# Patient Record
Sex: FEMALE | Race: WHITE | NOT HISPANIC OR LATINO | Employment: OTHER | URBAN - METROPOLITAN AREA
[De-identification: names, ages, dates, MRNs, and addresses within clinical notes are randomized per-mention and may not be internally consistent; named-entity substitution may affect disease eponyms.]

---

## 2020-05-08 ENCOUNTER — OFFICE VISIT (OUTPATIENT)
Dept: PULMONOLOGY | Facility: CLINIC | Age: 65
End: 2020-05-08

## 2020-05-08 VITALS
SYSTOLIC BLOOD PRESSURE: 110 MMHG | OXYGEN SATURATION: 97 % | HEIGHT: 69 IN | DIASTOLIC BLOOD PRESSURE: 60 MMHG | HEART RATE: 74 BPM | BODY MASS INDEX: 24.38 KG/M2 | WEIGHT: 164.6 LBS | TEMPERATURE: 96.9 F

## 2020-05-08 DIAGNOSIS — Q33.3 CONGENITAL ABSENCE OF LUNG: Primary | ICD-10-CM

## 2020-05-08 DIAGNOSIS — Z90.2 S/P PNEUMONECTOMY: ICD-10-CM

## 2020-05-08 DIAGNOSIS — G47.33 OSA (OBSTRUCTIVE SLEEP APNEA): ICD-10-CM

## 2020-05-08 DIAGNOSIS — J98.4 RESTRICTIVE LUNG DISEASE: ICD-10-CM

## 2020-05-08 DIAGNOSIS — J47.9 BRONCHIECTASIS WITHOUT ACUTE EXACERBATION (HCC): ICD-10-CM

## 2020-05-08 DIAGNOSIS — Z00.6 EXAMINATION FOR NORMAL COMPARISON FOR CLINICAL RESEARCH: Primary | ICD-10-CM

## 2020-05-08 DIAGNOSIS — J30.1 SEASONAL ALLERGIC RHINITIS DUE TO POLLEN: ICD-10-CM

## 2020-05-08 PROCEDURE — 99204 OFFICE O/P NEW MOD 45 MIN: CPT | Performed by: INTERNAL MEDICINE

## 2020-05-08 PROCEDURE — 94060 EVALUATION OF WHEEZING: CPT | Performed by: INTERNAL MEDICINE

## 2020-05-08 PROCEDURE — 94618 PULMONARY STRESS TESTING: CPT | Performed by: INTERNAL MEDICINE

## 2020-05-08 PROCEDURE — 94726 PLETHYSMOGRAPHY LUNG VOLUMES: CPT | Performed by: INTERNAL MEDICINE

## 2020-05-08 PROCEDURE — 94729 DIFFUSING CAPACITY: CPT | Performed by: INTERNAL MEDICINE

## 2020-05-08 RX ORDER — MONTELUKAST SODIUM 10 MG/1
10 TABLET ORAL NIGHTLY
COMMUNITY
Start: 2020-04-21

## 2020-05-08 RX ORDER — ALBUTEROL SULFATE 90 UG/1
4 AEROSOL, METERED RESPIRATORY (INHALATION) ONCE
Status: COMPLETED | OUTPATIENT
Start: 2020-05-08 | End: 2020-05-08

## 2020-05-08 RX ORDER — ALBUTEROL SULFATE 2.5 MG/3ML
2.5 SOLUTION RESPIRATORY (INHALATION) EVERY 4 HOURS PRN
COMMUNITY
Start: 2020-04-23 | End: 2020-11-13

## 2020-05-08 RX ORDER — BUDESONIDE 0.5 MG/2ML
0.5 INHALANT ORAL
COMMUNITY
Start: 2020-04-23

## 2020-05-08 RX ORDER — ESTRADIOL 0.1 MG/G
2 CREAM VAGINAL DAILY
COMMUNITY
End: 2022-12-06 | Stop reason: SDUPTHER

## 2020-05-08 RX ORDER — FLUTICASONE PROPIONATE 50 MCG
2 SPRAY, SUSPENSION (ML) NASAL AS NEEDED
COMMUNITY
End: 2021-05-11

## 2020-05-08 RX ORDER — LEVOTHYROXINE SODIUM 100 UG/1
75 CAPSULE ORAL DAILY
COMMUNITY
Start: 2020-04-21 | End: 2021-11-02 | Stop reason: ALTCHOICE

## 2020-05-08 RX ADMIN — ALBUTEROL SULFATE 4 PUFF: 90 AEROSOL, METERED RESPIRATORY (INHALATION) at 09:43

## 2020-05-08 NOTE — PROGRESS NOTES
"Crystal Carrillo is a 65 y.o. female here for evaluation of   Chief Complaint   Patient presents with   • Congenital Absence of Lung   • Shortness of Breath       Problem list:  1. Restrictive ventilatory defect  2. H/o left pneumonectomy, infant  3. Bronchiectasis  4. History of Pseudomonas infection  5. H/o primary TB 2017 treated  6. SRINIVAS  7. Seasonal allergic rhinitis  8. Cervical disk disease  9. Hypothyroid  10. SVT  11. Permanent pacemaker, , battery change,   12. Left pneumonectomy, ,   13. Left thoracoplasty,   14. Childbirth x2  15. Appendectomy  16. Vaginal hysterectomy  17. Umbilical hernia repair  18. No drug allergies        History of Present Illness    65-year-old woman, non-smoker, here to establish care because her pulmonologist  unexpectedly in December.  When she was 6 weeks old she underwent a left pneumonectomy for \"cysts in her lungs.  She has been checked for cystic fibrosis and that testing has been negative.  Then in  she contracted active tuberculosis and received standard treatment.  During that year she also had her first pseudomonas aeruginosa infection.  She is currently using Pulmicort nebulized twice daily and albuterol twice daily and as needed.  She has been using these medications since  when she developed some wheezing.  She is symptomatically short of air walking up steps, doing housework, carrying groceries.  She has had no further hemoptysis since her tuberculosis was treated.  She developed a respiratory infection in January and end of February and was quite ill with a relentless cough.  She required 2 rounds of antibiotics.  While her cough is improved she has been symptomatically more short of air.  She does not describe wheezing.  Her last hospitalization was in  for her heart, SVT.  She has never required life support.  She was diagnosed with sleep apnea and started on CPAP.  Last summer she stopped wearing it.  She then got a new machine " and started wearing it again.  However more recently she is stopped again because she was afraid it might be causing the respiratory infections.  She does have fatigue.  With the spring season she has developed some clear watery sinus congestion.  She has no history of aspirin sensitivity or nasal polyps.  She does have a history of SVT and had a pacemaker placed in 2008 and a new battery in 2014.  To her knowledge she has not had an ablation.  Her last pacemaker procedure was done in [facility].  She knows that she is has a recent Tdap vaccine when her grandson was born.  She recalls getting the Pneumovax 23 but is not sure whether she has had a Prevnar vaccine.    Review of Systems   Constitutional: Negative for appetite change and fever.   HENT: Positive for postnasal drip. Negative for ear pain, rhinorrhea and sore throat.    Respiratory: Positive for shortness of breath and wheezing.    Cardiovascular: Negative for chest pain and leg swelling.   Gastrointestinal: Negative for abdominal pain and vomiting.   Musculoskeletal: Positive for neck pain.   Skin: Negative for rash.   Allergic/Immunologic: Positive for environmental allergies.   Neurological: Positive for headaches.         Current Outpatient Medications:   •  albuterol (PROVENTIL) (2.5 MG/3ML) 0.083% nebulizer solution, Take 2.5 mg by nebulization Every 4 (Four) Hours As Needed., Disp: , Rfl:   •  budesonide (PULMICORT) 0.5 MG/2ML nebulizer solution, Take 0.5 mg by nebulization Daily., Disp: , Rfl:   •  estradiol (ESTRACE) 0.1 MG/GM vaginal cream, Insert 2 g into the vagina Daily., Disp: , Rfl:   •  fluticasone (FLONASE) 50 MCG/ACT nasal spray, 2 sprays into the nostril(s) as directed by provider Daily., Disp: , Rfl:   •  levothyroxine (SYNTHROID, LEVOTHROID) 112 MCG tablet, Take 112 mcg by mouth Daily., Disp: , Rfl:   •  montelukast (SINGULAIR) 10 MG tablet, Take 10 mg by mouth Every Night., Disp: , Rfl:   •  sertraline (ZOLOFT) 50 MG  "tablet, Take 50 mg by mouth Daily., Disp: , Rfl:   No current facility-administered medications for this visit.     Past Medical History:   Diagnosis Date   • Allergic rhinitis    • Anxiety    • Cervical disc disease    • COPD (chronic obstructive pulmonary disease) (CMS/HCC)    • Hypothyroid    • PSVT (paroxysmal supraventricular tachycardia) (CMS/[facility])    • PVC's (premature ventricular contractions)    • Sleep apnea    • TB (pulmonary tuberculosis) 2017     Past Surgical History:   Procedure Laterality Date   • APPENDECTOMY     • CHEST SURGERY  1971    thoracoplasty   • LAPAROSCOPIC ASSISTED VAGINAL HYSTERECTOMY     • LUNG REMOVAL, TOTAL Left     infant for cyst   • PACEMAKER IMPLANTATION  2015    [facility]; 2008 first one   • UMBILICAL HERNIA REPAIR       Social History     Socioeconomic History   • Marital status:      Spouse name: Not on file   • Number of children: 2   • Years of education: Not on file   • Highest education level: Not on file   Occupational History   • Occupation: homemaker/   Tobacco Use   • Smoking status: Never Smoker   • Smokeless tobacco: Never Used   Substance and Sexual Activity   • Alcohol use: Never     Frequency: Never   • Drug use: Never   • Sexual activity: Defer   Social History Narrative     to second      Family History   Problem Relation Age of Onset   • Intracerebral hemorrhage Mother    • Glaucoma Father      Blood pressure 110/60, pulse 74, temperature 96.9 °F (36.1 °C), height 175.3 cm (69\"), weight 74.7 kg (164 lb 9.6 oz), SpO2 97 %    Physical Exam   Constitutional: She is oriented to person, place, and time. She appears well-developed and well-nourished. No distress.   HENT:   Head: Atraumatic.   Nasal erythema, clear PND   Eyes: Pupils are equal, round, and reactive to light.   Neck: No JVD present. No thyromegaly present.   Cardiovascular: Normal rate, regular rhythm and normal heart sounds.   No murmur " heard.  Pulmonary/Chest:   Diminished chest expansion on left; right chest clear, PPM left   Abdominal: Soft. Bowel sounds are normal. She exhibits no distension. There is no tenderness.   Musculoskeletal: She exhibits no edema.   Lymphadenopathy:     She has no cervical adenopathy.   Neurological: She is oriented to person, place, and time.   Skin: Skin is warm.   Psychiatric: She has a normal mood and affect.         PFTs:  FVC 1.32 L, 35%, FEV1 0.87 L, 30% ratio 66%, after bronchodilators FEV1 improves to 0.96, 33%, total lung capacity 2.46 L, 42%, diffusion capacity 8.4, 38    Severe restriction and diffusion impairment, mild obstruction    Radiology:      Right upper lobe has herniated over into the left upper chest.  Dual-chamber pacemaker in place.  Hyperinflation of the right lung with shift of the mediastinum to the left.  Scoliosis of her thoracic spine.  No acute infiltrate    6-minute walk test, resting saturation 97%, after walking 5 minutes saturation dropped to 89%.  She was able to walk 1200 feet.  Lab:    May 5, 2020, free T4 1.18, sodium 139, potassium 4.6, chloride 101, bicarbonate 30, BUN 24, creatinine 0.7, glucose 93, liver function tests normal, white count 4.6, hemoglobin 14.8, hematocrit 44.6, platelet count 264    Crystal was seen today for congenital absence of lung and shortness of breath.    Diagnoses and all orders for this visit:    Congenital absence of lung  -     albuterol sulfate HFA (PROVENTIL HFA;VENTOLIN HFA;PROAIR HFA) inhaler 4 puff  -     Pulmonary Function Test    Restrictive lung disease; chest wall deformity    Bronchiectasis without acute exacerbation (CMS/HCC)    SRINIVAS (obstructive sleep apnea)    Seasonal allergic rhinitis due to pollen    S/P pneumonectomy, left infant        Discussion:     Complex 65-year-old woman with a pneumonectomy as an infant, mild bronchiectasis, sleep apnea, allergic rhinitis, SVT with permanent pacemaker and a history of active tuberculosis.   Pulmonary function tests are striking with a total lung capacity of only 42%.  She does not currently have purulent sputum or active wheezing.  She does have a history of pseudomonas aeruginosa colonization.  Resting room air saturation is 97%.  She has been intermittently compliant with her CPAP.  This is inpart from respiratory infections and inadequate support from her [email or web address] company.  I suspect that she would benefit from possibly changing to BiPAP, not only for her sleep apnea but for her restrictive ventilatory defect.  I think it will be important to splint her chest open and relieve respiratory muscle fatigue in the future. Perhaps her persistent fatigue would improve and compliance as well if she tried BiPAP.   She also has symptoms of allergic rhinitis with clear drainage on examination.  She is using a nasal steroid, Claritin and Singulair.    Transition to BiPAP, as she has failed CPAP  So-Clean machine  6-minute walk test  Continue Pulmicort, albuterol nebulized  She has a flutter valve and I have encouraged her to use it if she has secretions  Incentive spirometry would be helpful to improve diaphragm function if he she used it 2-3 times daily  If she continues to develop recurrent Pseudomonas infections I will consider nebulized tobramycin every other month  Continue current allergy regimen  Check with her primary care physician about her vaccine record.  It is important that she gets a Pneumovax 23 every 5 years and a Prevnar 13 once  Follow-up in 6 months with spirometry      Susan Long MD        Answers for HPI/ROS submitted by the patient on 5/6/2020   Shortness of breath  What is the primary reason for your visit?: Shortness of Breath  Chronicity: chronic  Onset: more than 1 year ago  Frequency: constantly  Progression since onset: gradually worsening  Episode duration: 10 minutes  claudication: No  coryza: No  hemoptysis: No  leg pain: No  orthopnea: No  PND: No  sputum production:  No  swollen glands: No  syncope: No  Aggravating factors: smoke, exercise, any activity, fumes

## 2020-06-15 ENCOUNTER — OFFICE VISIT (OUTPATIENT)
Dept: NEUROLOGY | Facility: CLINIC | Age: 65
End: 2020-06-15

## 2020-06-15 VITALS
HEIGHT: 69 IN | BODY MASS INDEX: 24.29 KG/M2 | SYSTOLIC BLOOD PRESSURE: 112 MMHG | DIASTOLIC BLOOD PRESSURE: 68 MMHG | HEART RATE: 85 BPM | OXYGEN SATURATION: 95 % | WEIGHT: 164 LBS

## 2020-06-15 DIAGNOSIS — M54.81 CERVICO-OCCIPITAL NEURALGIA: ICD-10-CM

## 2020-06-15 DIAGNOSIS — G25.2 DYSTONIC TREMOR: Primary | ICD-10-CM

## 2020-06-15 PROCEDURE — 99204 OFFICE O/P NEW MOD 45 MIN: CPT | Performed by: PSYCHIATRY & NEUROLOGY

## 2020-06-15 RX ORDER — CYCLOBENZAPRINE HCL 10 MG
10 TABLET ORAL 3 TIMES DAILY PRN
COMMUNITY
End: 2020-09-18

## 2020-06-15 RX ORDER — PSEUDOEPHEDRINE HCL 30 MG
30 TABLET ORAL EVERY 4 HOURS PRN
COMMUNITY

## 2020-06-15 RX ORDER — NAPROXEN SODIUM 220 MG
220 TABLET ORAL 2 TIMES DAILY PRN
COMMUNITY
End: 2022-09-27 | Stop reason: CLARIF

## 2020-06-15 NOTE — PROGRESS NOTES
Answers for HPI/ROS submitted by the patient on 6/8/2020   Neurologic complaint  What is the primary reason for your visit?: Neurological Problem  focal weakness: Yes  memory loss: Yes  weakness: Yes  Chronicity: chronic  Onset: more than 1 year ago  Onset quality: insidiously  Progression since onset: waxing and waning  Focality: right-sided, upper extremity  abdominal pain: No  auditory change: No  aura: No  back pain: Yes  bladder incontinence: No  bowel incontinence: No  chest pain: Yes  confusion: No  diaphoresis: Yes  dizziness: No  fatigue: Yes  fever: No  headaches: Yes  light-headedness: No  nausea: No  neck pain: Yes  palpitations: No  shortness of breath: Yes  vertigo: No  vomiting: No  Treatments tried: acetaminophen, aspirin, medication, neck support, position change, walking  Improvement on treatment: moderate  Subjective:    CC: Crystal Carrillo is seen today in consultation at the request of Self Referring for Migraine (NP)       HPI:  65-year-old female with a history of hypothyroidism, obstructive sleep apnea currently not on CPAP, bronchiectasis, left lung resection as a child, symptomatic bradycardia status post pacemaker, scoliosis presents with head tremors and headaches.  As per patient she started having a tremor in her head about 15 years ago.  This worsens when she is tired and is accompanied by pain on the right side of her neck that has become worse in the past year.  Has not noticed any tremors in her hands.  She denies having any family history of tremors.   She had a CT C-spine that showed multilevel degenerative changes with moderate left to right foraminal stenosis at multiple levels..  Underwent physical therapy for 6 weeks that actually made the pain worse.  After that she was prescribed a muscle relaxant Skelaxin which did not help either.  With regards to her headaches patient states that she started having them as a teenager but they have also changed in frequency and  characteristic in the past 1 year.  She would mainly have them on the right side of her head occasionally radiating from the back to the front however  has had them all over now about once in 10 days without any nausea, vomiting or photophobia.  Has occasional phonophobia as well as a grinding sensation in her ear.  She takes Flexeril along with naproxen (1 to 2 tablets) which usually helps with the headaches.  Also takes Aleve every few days for her neck pain.  Patient has sleep apnea and was given a CPAP which she was compliant with until she had a Pseudomonas infection and tuberculosis.  Has started seeing a pulmonologist again and will be going for BiPAP soon.  Of note-I personally reviewed her CT of the cervical spine and her most recent labs.      The following portions of the patient's history were reviewed today and updated as of 06/15/2020  : allergies, current medications, past family history, past medical history, past social history, past surgical history and problem list  These document will be scanned to patient's chart.      Current Outpatient Medications:   •  Acetaminophen (TYLENOL EXTRA STRENGTH PO), Take  by mouth As Needed (When really bad HA and she is not near food- takes a combination of tylenol and sudafed)., Disp: , Rfl:   •  albuterol (PROVENTIL) (2.5 MG/3ML) 0.083% nebulizer solution, Take 2.5 mg by nebulization Every 4 (Four) Hours As Needed., Disp: , Rfl:   •  budesonide (PULMICORT) 0.5 MG/2ML nebulizer solution, Take 0.5 mg by nebulization Daily., Disp: , Rfl:   •  cyclobenzaprine (FLEXERIL) 10 MG tablet, Take 10 mg by mouth 3 (Three) Times a Day As Needed for Muscle Spasms (Neck pain)., Disp: , Rfl:   •  estradiol (ESTRACE) 0.1 MG/GM vaginal cream, Insert 2 g into the vagina Daily., Disp: , Rfl:   •  fluticasone (FLONASE) 50 MCG/ACT nasal spray, 2 sprays into the nostril(s) as directed by provider As Needed for Allergies (Seasonal/location wise-allergic to cat dander)., Disp: , Rfl:    •  levothyroxine (SYNTHROID, LEVOTHROID) 112 MCG tablet, Take 112 mcg by mouth Daily., Disp: , Rfl:   •  montelukast (SINGULAIR) 10 MG tablet, Take 10 mg by mouth Every Night. Prn for allergies, Disp: , Rfl:   •  naproxen sodium (ALEVE) 220 MG tablet, Take 220 mg by mouth 2 (Two) Times a Day As Needed for Mild Pain ., Disp: , Rfl:   •  pseudoephedrine (SUDAFED) 30 MG tablet, Take 30 mg by mouth Every 4 (Four) Hours As Needed (When really bad HA and she is not near food- takes a combination of tylenol and sudafed)., Disp: , Rfl:   •  sertraline (ZOLOFT) 50 MG tablet, Take 50 mg by mouth Daily. 6/15-reports weaning off of this. Currently on 50mg daily, next month down to 25mg and then she will D/C this, Disp: , Rfl:    Past Medical History:   Diagnosis Date   • Allergic rhinitis    • Anxiety    • Cervical disc disease    • COPD (chronic obstructive pulmonary disease) (CMS/HCC)    • Hypothyroid    • PSVT (paroxysmal supraventricular tachycardia) (CMS/[facility])    • PVC's (premature ventricular contractions)    • Sleep apnea    • TB (pulmonary tuberculosis) 2017      Past Surgical History:   Procedure Laterality Date   • APPENDECTOMY     • CHEST SURGERY  1971    thoracoplasty   • LAPAROSCOPIC ASSISTED VAGINAL HYSTERECTOMY     • LUNG REMOVAL, TOTAL Left     infant for cyst   • PACEMAKER IMPLANTATION  2015    [facility]; 2008 first one   • UMBILICAL HERNIA REPAIR        Family History   Problem Relation Age of Onset   • Intracerebral hemorrhage Mother    • Glaucoma Father       Social History     Socioeconomic History   • Marital status:      Spouse name: Not on file   • Number of children: 2   • Years of education: Not on file   • Highest education level: Not on file   Occupational History   • Occupation: homemaker/   Tobacco Use   • Smoking status: Never Smoker   • Smokeless tobacco: Never Used   Substance and Sexual Activity   • Alcohol use: Never     Frequency: Never   • Drug use: Never  "  • Sexual activity: Defer   Social History Narrative     to second      Review of Systems   Constitutional: Positive for diaphoresis and fatigue. Negative for fever.   Respiratory: Positive for shortness of breath.    Cardiovascular: Positive for chest pain. Negative for palpitations.   Gastrointestinal: Negative for abdominal pain, nausea and vomiting.   Genitourinary: Negative for urinary incontinence.   Musculoskeletal: Positive for back pain and neck pain.   Neurological: Positive for weakness. Negative for dizziness, light-headedness and confusion.   All other systems reviewed and are negative.      Objective:    /68   Pulse 85   Ht 175.3 cm (69\")   Wt 74.4 kg (164 lb)   LMP  (LMP Unknown)   SpO2 95%   BMI 24.22 kg/m²     Neurology Exam:    General apperance: NAD.     Mental status: Alert, awake and oriented to time place and person.    Recent and Remote memory: Intact.    Attention span and Concentration: Normal.     Language and Speech: Intact- No dysarthria.    Fluency, Naming , Repitition and Comprehension:  Intact    Cranial Nerves:   CN II: Visual fields are full. Intact. Fundi - Normal, No papillederma, Pupils - RENÉE  CN III, IV and VI: Extraocular movements are intact. Normal saccades.   CN V: Facial sensation is intact.   CN VII: Muscles of facial expression reveal no asymmetry. Intact.   CN VIII: Hearing is intact. Whispered voice intact.   CN IX and X: Palate elevates symmetrically. Intact  CN XI: Shoulder shrug is intact.   CN XII: Tongue is midline without evidence of atrophy or fasciculation.       Motor:-Constant, mild head tremor noted with reduced range of motion of the head to the right.  Tenderness to palpation of the right occipital region    Right UE muscle strength 5/5. Normal tone.     Left UE muscle strength 5/5. Normal tone.      Right LE muscle strength5/5. Normal tone.     Left LE muscle strength 5/5. Normal tone.      Sensory: Normal light touch, vibration " and pinprick sensation bilaterally.    DTRs: 2+ bilaterally in upper and lower extremities.    Babinski: Negative bilaterally.    Co-ordination: Normal finger-to-nose, heel to shin B/L.    Rhomberg: Negative.    Gait: Normal.    Cardiovascular: Regular rate and rhythm without murmur, gallop or rub.    Assessment and Plan:  1. Dystonic tremor  She could have an essential tremor versus dystonic tremor   I will refer her to Dr. Isaacs at  for Botox  - Ambulatory Referral to Neurology    2. Cervico-occipital neuralgia  Patient most likely has cervical occipital headaches which could be further worsened by her dystonic tremor.  Also her untreated sleep apnea could be making the headaches worse  If she continues to have headaches after getting Botox then I may schedule her for an occipital nerve block.  Till then I have asked her to continue taking a combination of Flexeril and naproxen however she should avoid using too much naproxen for fear of rebound headaches  Will also be getting a BiPAP soon for his sleep apnea    3.  Hypothyroidism  She has a history of hypothyroidism however her recent T4 was high and TSH was low.  I told her to speak to her endocrinologist about adjusting her Synthroid dose       Return in about 3 months (around 9/15/2020).         Jannette Simons MD

## 2020-06-19 ENCOUNTER — TELEPHONE (OUTPATIENT)
Dept: NEUROLOGY | Facility: CLINIC | Age: 65
End: 2020-06-19

## 2020-06-19 NOTE — TELEPHONE ENCOUNTER
Called and spoke to pt informing of scheduled appt with Dr. Ralph Muñoz, 10/29/20 @ 8:30 at [facility]. Pt stated verbal understanding. Faxed referral order, notes, demographics, ambulatory consent, and insurance ([phone]). Thanks.

## 2020-07-21 ENCOUNTER — TELEPHONE (OUTPATIENT)
Dept: NEUROLOGY | Facility: CLINIC | Age: 65
End: 2020-07-21

## 2020-07-21 NOTE — TELEPHONE ENCOUNTER
PT CALLED RE: NECK PAIN AND HEADACHES SHE HAS BEEN EXPERIENCING.  DR ADAM REFERRED HER TO DR. SAEZ AT  FOR BOTOX; HOWEVER, SHE DOES NOT HAVE A VISIT SCHEDULED UNTIL 12-16-20.  PT WOULD LIKE TO KNOW IF THERE ARE WAYS TO MITIGATE THE NECK PAIN AND HEADACHES PRIOR TO SEEING DR. SAEZ AS SHE IS HAVING DIFFICULTY GETTING THROUGH HER DAYS DUE TO PAIN.    PT LAST SEEN 6-15-20    PHARMACY:  [place], [place]  [phone]    BEST CALL BACK: [phone]; LEAVE VM IF NO ANSWER

## 2020-07-21 NOTE — TELEPHONE ENCOUNTER
Please let the patient know that I have contacted Dr. Isaacs to get the patient in sooner.  If she does not hear back from him within the next few days then she can call us again.

## 2020-08-03 ENCOUNTER — OFFICE VISIT (OUTPATIENT)
Dept: PULMONOLOGY | Facility: CLINIC | Age: 65
End: 2020-08-03

## 2020-08-03 VITALS
WEIGHT: 166.13 LBS | DIASTOLIC BLOOD PRESSURE: 84 MMHG | RESPIRATION RATE: 18 BRPM | HEIGHT: 69 IN | OXYGEN SATURATION: 95 % | TEMPERATURE: 96.9 F | HEART RATE: 97 BPM | BODY MASS INDEX: 24.61 KG/M2 | SYSTOLIC BLOOD PRESSURE: 116 MMHG

## 2020-08-03 DIAGNOSIS — J98.4 RESTRICTIVE LUNG DISEASE: ICD-10-CM

## 2020-08-03 DIAGNOSIS — G47.33 OSA (OBSTRUCTIVE SLEEP APNEA): ICD-10-CM

## 2020-08-03 DIAGNOSIS — J47.9 BRONCHIECTASIS WITHOUT ACUTE EXACERBATION (HCC): Primary | ICD-10-CM

## 2020-08-03 DIAGNOSIS — J30.1 SEASONAL ALLERGIC RHINITIS DUE TO POLLEN: ICD-10-CM

## 2020-08-03 PROCEDURE — 99213 OFFICE O/P EST LOW 20 MIN: CPT | Performed by: INTERNAL MEDICINE

## 2020-08-03 NOTE — PROGRESS NOTES
"Crystal Carrillo is a 65 y.o. female here for evaluation of   Chief Complaint   Patient presents with   • Congenital Absence of Lung     f/u       Problem list:  1. Restrictive ventilatory defect  2. H/o left pneumonectomy, infant  3. Bronchiectasis  4. History of Pseudomonas infection  5. H/o primary TB 2017 treated  6. SRINIVAS  7. Seasonal allergic rhinitis  8. Cervical disk disease  9. Hypothyroid  10. SVT  11. Permanent pacemaker, 2008, battery change, 2015  12. Left pneumonectomy, 1956, June  13. Left thoracoplasty, 1971  14. Childbirth x2  15. Appendectomy  16. Vaginal hysterectomy  17. Umbilical hernia repair  18. No drug allergies    History of Present Illness    65-year-old woman, non-smoker with a history of left pneumonectomy when she was 7 years of age for \"cysts\".  She tested negative for cystic fibrosis.  Subsequently in 2017 she developed active tuberculosis and received standard treatment.  Post treatment for tuberculosis she developed a pseudomonas aeruginosa infection.  She is currently using Pulmicort nebulized twice daily and albuterol as needed.  She is short of air walking up 1 flight of stairs.  She has a lot of neck discomfort.  She does have a history of sleep apnea and had previously used CPAP.  She has been off of it for over a year.  She does admit to fatigue.  She denies hemoptysis or purulent sputum.  While she is short of air she denies wheezing.  She does have some clear postnasal drip.  She currently does not take any medication for drainage.  At her initial visit I did arrange BiPAP 12/8.  She is tried 3 different masks but it blows out of her mouth and she feels like it is too high of a pressure.  She has not been able to tolerate it for an entire night.  She has not required antibiotics or an emergency room visit since I last saw her.    Review of Systems   Constitutional: Positive for activity change and fatigue.   HENT: Positive for postnasal drip.    Respiratory: Positive for cough " and shortness of breath. Negative for chest tightness and wheezing.    Musculoskeletal: Positive for neck pain and neck stiffness.         Current Outpatient Medications:   •  Acetaminophen (TYLENOL EXTRA STRENGTH PO), Take  by mouth As Needed (When really bad HA and she is not near food- takes a combination of tylenol and sudafed)., Disp: , Rfl:   •  albuterol (PROVENTIL) (2.5 MG/3ML) 0.083% nebulizer solution, Take 2.5 mg by nebulization Every 4 (Four) Hours As Needed., Disp: , Rfl:   •  budesonide (PULMICORT) 0.5 MG/2ML nebulizer solution, Take 0.5 mg by nebulization Daily., Disp: , Rfl:   •  cyclobenzaprine (FLEXERIL) 10 MG tablet, Take 10 mg by mouth 3 (Three) Times a Day As Needed for Muscle Spasms (Neck pain)., Disp: , Rfl:   •  estradiol (ESTRACE) 0.1 MG/GM vaginal cream, Insert 2 g into the vagina Daily., Disp: , Rfl:   •  fluticasone (FLONASE) 50 MCG/ACT nasal spray, 2 sprays into the nostril(s) as directed by provider As Needed for Allergies (Seasonal/location wise-allergic to cat dander)., Disp: , Rfl:   •  levothyroxine (SYNTHROID, LEVOTHROID) 112 MCG tablet, Take 112 mcg by mouth Daily., Disp: , Rfl:   •  montelukast (SINGULAIR) 10 MG tablet, Take 10 mg by mouth Every Night. Prn for allergies, Disp: , Rfl:   •  naproxen sodium (ALEVE) 220 MG tablet, Take 220 mg by mouth 2 (Two) Times a Day As Needed for Mild Pain ., Disp: , Rfl:   •  pseudoephedrine (SUDAFED) 30 MG tablet, Take 30 mg by mouth Every 4 (Four) Hours As Needed (When really bad HA and she is not near food- takes a combination of tylenol and sudafed)., Disp: , Rfl:   •  sertraline (ZOLOFT) 50 MG tablet, Take 50 mg by mouth Daily. 6/15-reports weaning off of this. Currently on 50mg daily, next month down to 25mg and then she will D/C this, Disp: , Rfl:     Past Medical History:   Diagnosis Date   • Allergic rhinitis    • Anxiety    • Cervical disc disease    • COPD (chronic obstructive pulmonary disease) (CMS/HCC)    • Hypothyroid    • PSVT  "(paroxysmal supraventricular tachycardia) (CMS/HCC)    • PVC's (premature ventricular contractions)    • Sleep apnea    • TB (pulmonary tuberculosis) 2017     Past Surgical History:   Procedure Laterality Date   • APPENDECTOMY     • CHEST SURGERY  1971    thoracoplasty   • LAPAROSCOPIC ASSISTED VAGINAL HYSTERECTOMY     • LUNG REMOVAL, TOTAL Left     infant for cyst   • PACEMAKER IMPLANTATION  2015    [facility]; 2008 first one   • UMBILICAL HERNIA REPAIR       Social History     Socioeconomic History   • Marital status:      Spouse name: Not on file   • Number of children: 2   • Years of education: Not on file   • Highest education level: Not on file   Occupational History   • Occupation: homemaker/   Tobacco Use   • Smoking status: Never Smoker   • Smokeless tobacco: Never Used   Substance and Sexual Activity   • Alcohol use: Never     Frequency: Never   • Drug use: Never   • Sexual activity: Defer   Social History Narrative     to second      Family History   Problem Relation Age of Onset   • Intracerebral hemorrhage Mother    • Glaucoma Father      Blood pressure 116/84, pulse 97, temperature 96.9 °F (36.1 °C), resp. rate 18, height 175.3 cm (69\"), weight 75.4 kg (166 lb 2 oz), SpO2 95 %,     Physical Exam   Constitutional: She is oriented to person, place, and time. She appears well-developed and well-nourished.   HENT:   Head: Normocephalic and atraumatic.   Clear PND   Eyes: Pupils are equal, round, and reactive to light. EOM are normal.   Neck: Thyromegaly present.   Cardiovascular: Normal rate, regular rhythm and normal heart sounds.   No murmur heard.  Pulmonary/Chest:   Scoliosis left lung clear, right absent   Musculoskeletal: She exhibits no edema.   Lymphadenopathy:     She has cervical adenopathy.   Neurological: She is alert and oriented to person, place, and time.   Skin: Skin is dry.   Psychiatric: She has a normal mood and affect.         PFTs:    May " 8, 2020, FVC 1.32 L, 35%, FEV1 0.87 L, 30% ratio 66%.  Total lung capacity was 2.46 L, 42%, diffusion capacity 8.4, 38%    6-minute walk test at her initial visit May 8, resting saturation 97% after walking 5-minute she dropped to 89% and stopped walking.  She walked 1200 feet     Outside sleep study revealed AHI of 35.7 during REM with desaturation to 77%, 2012    Radiology:    Lab:    May 5, 2020, white count 4.6, hemoglobin 14.8, platelet count 264, 57% polys, 30% lymphocytes, 3% eosinophils, sodium 139, potassium 4.6, chloride 101, bicarbonate 30, glucose 93, creatinine 0.7, BUN 24, calcium 10.1, LFTs normal    Crystal was seen today for congenital absence of lung.    Diagnoses and all orders for this visit:    Bronchiectasis without acute exacerbation (CMS/HCC)    Restrictive lung disease; chest wall deformity    SRINIVAS (obstructive sleep apnea)        Discussion:     Complex 65-year-old woman with a history of pneumonectomy as an infant, mild bronchiectasis, sleep apnea and allergic rhinitis.  She has a permanent pacemaker for supraventricular tachycardia.  She has severe restriction on her pulmonary function testing as I would expect.  She does desaturate with exertion.  When I initially saw her she did not wish oxygen.  However she does admit significant dyspnea just trying to walk up a flight of stairs.  She dropped to 89% in 5 minutes.  I am confident that have a she walked further that she would have been below 89%.  In the past she was actively participating in a pulmonary rehabilitation program but that program has since closed.  There is a new one open 20 minutes from her house.  When she participated in physical therapy they did have to place oxygen for desaturation.  I did reorder BiPAP but she is having difficulty with the pressure levels.    Adjust BiPAP pressures to 10/4  Continue Pulmicort and albuterol nebulizer  Encourage her to restart pulmonary rehabilitation  Flu shot in October  I would like  a record of her Pneumovax and Prevnar vaccines  Arrange home oxygen to use with exertion at 2 L.  She can increase up to 4 L as needed.  I would prefer a portable concentrator which will allow her to be more active.  Follow-up in November with spirometry  Susan Long MD

## 2020-08-20 DIAGNOSIS — J98.4 RESTRICTIVE LUNG DISEASE: Primary | ICD-10-CM

## 2020-08-25 ENCOUNTER — TRANSCRIBE ORDERS (OUTPATIENT)
Dept: PULMONOLOGY | Facility: CLINIC | Age: 65
End: 2020-08-25

## 2020-09-18 ENCOUNTER — OFFICE VISIT (OUTPATIENT)
Dept: NEUROLOGY | Facility: CLINIC | Age: 65
End: 2020-09-18

## 2020-09-18 VITALS
WEIGHT: 168.4 LBS | SYSTOLIC BLOOD PRESSURE: 118 MMHG | BODY MASS INDEX: 24.94 KG/M2 | HEIGHT: 69 IN | DIASTOLIC BLOOD PRESSURE: 84 MMHG | TEMPERATURE: 97.3 F

## 2020-09-18 DIAGNOSIS — M54.81 CERVICO-OCCIPITAL NEURALGIA: ICD-10-CM

## 2020-09-18 DIAGNOSIS — G25.2 DYSTONIC TREMOR: Primary | ICD-10-CM

## 2020-09-18 PROCEDURE — 64450 NJX AA&/STRD OTHER PN/BRANCH: CPT | Performed by: PSYCHIATRY & NEUROLOGY

## 2020-09-18 PROCEDURE — 99213 OFFICE O/P EST LOW 20 MIN: CPT | Performed by: PSYCHIATRY & NEUROLOGY

## 2020-09-18 PROCEDURE — 64405 NJX AA&/STRD GR OCPL NRV: CPT | Performed by: PSYCHIATRY & NEUROLOGY

## 2020-09-18 RX ORDER — CYCLOBENZAPRINE HCL 5 MG
5 TABLET ORAL EVERY 8 HOURS PRN
Qty: 30 TABLET | Refills: 1 | Status: SHIPPED | OUTPATIENT
Start: 2020-09-18 | End: 2020-11-13 | Stop reason: SDUPTHER

## 2020-09-18 NOTE — PROGRESS NOTES
Subjective:    CC: Crystal Carrillo is seen today for Tremors       HPI:  Current visit- since the last visit patient saw Dr. Isaacs at  for Botox for her dystonic tremor.  Initially that caused her to have pain in her neck and the back of her head but over time that pain has subsided.  She has noticed a very slight improvement in her tremor after the Botox however her headache frequency has worsened as she is now getting about 4 headaches per week in the back of her head radiating to the front on both sides but mainly the right.  Taking over-the-counter medications along with Flexeril does not help much.  Moreover she was given a higher dose of Flexeril 10 mg which makes her extremely drowsy.  Has also started using a BiPAP for her sleep apnea.  I gave her the first occipital nerve block for her occipital headaches in clinic today which she tolerated well.  Procedure note for occipital nerve block-The patient's chart was reviewed.  After obtaining verbal consent the patient was placed in a sitting position with her neck flexed and his chin touching his chest.  Both the left and right occipital areas were prepped with antiseptic solution and injected with bupivacaine 0.5% using a 27-gauge needle.  3 cc of bupivacaine was injected at the site of the greater occipital nerve on each side and 1 cc was injected in the lesser occipital nerve on each side.  Patient tolerated the procedure well.    Initial visit -65-year-old female with a history of hypothyroidism, obstructive sleep apnea currently not on CPAP, bronchiectasis, left lung resection as a child, symptomatic bradycardia status post pacemaker, scoliosis presents with head tremors and headaches.  As per patient she started having a tremor in her head about 15 years ago.  This worsens when she is tired and is accompanied by pain on the right side of her neck that has become worse in the past year.  Has not noticed any tremors in her hands.  She denies having any  family history of tremors.   She had a CT C-spine that showed multilevel degenerative changes with moderate left to right foraminal stenosis at multiple levels..  Underwent physical therapy for 6 weeks that actually made the pain worse.  After that she was prescribed a muscle relaxant Skelaxin which did not help either.  With regards to her headaches patient states that she started having them as a teenager but they have also changed in frequency and characteristic in the past 1 year.  She would mainly have them on the right side of her head occasionally radiating from the back to the front however  has had them all over now about once in 10 days without any nausea, vomiting or photophobia.  Has occasional phonophobia as well as a grinding sensation in her ear.  She takes Flexeril along with naproxen (1 to 2 tablets) which usually helps with the headaches.  Also takes Aleve every few days for her neck pain.  Patient has sleep apnea and was given a CPAP which she was compliant with until she had a Pseudomonas infection and tuberculosis.  Has started seeing a pulmonologist again and will be going for BiPAP soon.  Of note-I personally reviewed her CT of the cervical spine and her most recent labs.      The following portions of the patient's history were reviewed today and updated as of 06/15/2020  : allergies, current medications, past family history, past medical history, past social history, past surgical history and problem list  These document will be scanned to patient's chart.      Current Outpatient Medications:   •  Acetaminophen (TYLENOL EXTRA STRENGTH PO), Take  by mouth As Needed (When really bad HA and she is not near food- takes a combination of tylenol and sudafed)., Disp: , Rfl:   •  albuterol (PROVENTIL) (2.5 MG/3ML) 0.083% nebulizer solution, Take 2.5 mg by nebulization Every 4 (Four) Hours As Needed., Disp: , Rfl:   •  budesonide (PULMICORT) 0.5 MG/2ML nebulizer solution, Take 0.5 mg by nebulization  Daily., Disp: , Rfl:   •  estradiol (ESTRACE) 0.1 MG/GM vaginal cream, Insert 2 g into the vagina Daily., Disp: , Rfl:   •  fluticasone (FLONASE) 50 MCG/ACT nasal spray, 2 sprays into the nostril(s) as directed by provider As Needed for Allergies (Seasonal/location wise-allergic to cat dander)., Disp: , Rfl:   •  levothyroxine (SYNTHROID, LEVOTHROID) 112 MCG tablet, Take 112 mcg by mouth Daily., Disp: , Rfl:   •  montelukast (SINGULAIR) 10 MG tablet, Take 10 mg by mouth Every Night. Prn for allergies, Disp: , Rfl:   •  naproxen sodium (ALEVE) 220 MG tablet, Take 220 mg by mouth 2 (Two) Times a Day As Needed for Mild Pain ., Disp: , Rfl:   •  pseudoephedrine (SUDAFED) 30 MG tablet, Take 30 mg by mouth Every 4 (Four) Hours As Needed (When really bad HA and she is not near food- takes a combination of tylenol and sudafed)., Disp: , Rfl:   •  sertraline (ZOLOFT) 50 MG tablet, Take 25 mg by mouth Daily. 6/15-reports weaning off of this. Currently on 50mg daily, next month down to 25mg and then she will D/C this, Disp: , Rfl:   •  cyclobenzaprine (FLEXERIL) 5 MG tablet, Take 1 tablet by mouth Every 8 (Eight) Hours As Needed for Muscle Spasms., Disp: 30 tablet, Rfl: 1   Past Medical History:   Diagnosis Date   • Allergic rhinitis    • Anxiety    • Cervical disc disease    • COPD (chronic obstructive pulmonary disease) (CMS/HCC)    • Hypothyroid    • PSVT (paroxysmal supraventricular tachycardia) (CMS/HCC)    • PVC's (premature ventricular contractions)    • Sleep apnea    • TB (pulmonary tuberculosis) 2017      Past Surgical History:   Procedure Laterality Date   • APPENDECTOMY     • CHEST SURGERY  1971    thoracoplasty   • LAPAROSCOPIC ASSISTED VAGINAL HYSTERECTOMY     • LUNG REMOVAL, TOTAL Left     infant for cyst   • PACEMAKER IMPLANTATION  2015    [facility]; 2008 first one   • UMBILICAL HERNIA REPAIR        Family History   Problem Relation Age of Onset   • Intracerebral hemorrhage Mother    • Glaucoma Father   "     Social History     Socioeconomic History   • Marital status:      Spouse name: Not on file   • Number of children: 2   • Years of education: Not on file   • Highest education level: Not on file   Occupational History   • Occupation: homemaker/   Tobacco Use   • Smoking status: Never Smoker   • Smokeless tobacco: Never Used   Substance and Sexual Activity   • Alcohol use: Never     Frequency: Never   • Drug use: Never   • Sexual activity: Defer   Social History Narrative     to second      Review of Systems   Constitutional: Positive for diaphoresis and fatigue. Negative for fever.   Cardiovascular: Negative for palpitations.   Gastrointestinal: Negative for abdominal pain, nausea and vomiting.   Genitourinary: Negative for urinary incontinence.   Musculoskeletal: Positive for back pain and neck pain.   Neurological: Positive for weakness and headache. Negative for dizziness, light-headedness and confusion.   All other systems reviewed and are negative.      Objective:    /84   Temp 97.3 °F (36.3 °C) (Temporal)   Ht 175.6 cm (69.13\")   Wt 76.4 kg (168 lb 6.4 oz)   LMP  (LMP Unknown)   BMI 24.77 kg/m²     Neurology Exam:    General apperance: NAD.     Mental status: Alert, awake and oriented to time place and person.    Recent and Remote memory: Intact.    Attention span and Concentration: Normal.     Language and Speech: Intact- No dysarthria.    Fluency, Naming , Repitition and Comprehension:  Intact    Cranial Nerves:   CN II: Visual fields are full. Intact. Fundi - Normal, No papillederma, Pupils - RENÉE  CN III, IV and VI: Extraocular movements are intact. Normal saccades.   CN V: Facial sensation is intact.   CN VII: Muscles of facial expression reveal no asymmetry. Intact.   CN VIII: Hearing is intact. Whispered voice intact.   CN IX and X: Palate elevates symmetrically. Intact  CN XI: Shoulder shrug is intact.   CN XII: Tongue is midline without evidence " of atrophy or fasciculation.       Motor:-Constant, mild head tremor noted with reduced range of motion of the head to the right.  Tenderness to palpation of the right more than left occipital region    Right UE muscle strength 5/5. Normal tone.     Left UE muscle strength 5/5. Normal tone.      Right LE muscle strength5/5. Normal tone.     Left LE muscle strength 5/5. Normal tone.      Sensory: Normal light touch, vibration and pinprick sensation bilaterally.    DTRs: 2+ bilaterally in upper and lower extremities.    Babinski: Negative bilaterally.    Co-ordination: Normal finger-to-nose, heel to shin B/L.    Rhomberg: Negative.    Gait: Normal.    Cardiovascular: Regular rate and rhythm without murmur, gallop or rub.    Assessment and Plan:  1. Dystonic tremor  I have asked her to continue getting Botox with Dr. Isaacs at  because I explained to her that her tremors should improve with each successive Botox treatment    2. Cervico-occipital neuralgia  I gave her the first occipital nerve block bilaterally today which she tolerated well  I will see her back in 6 weeks for her second round of ONB  I will also prescribe her Flexeril 5 mg that she can take as needed along with naproxen for her neck pain/spasms       Return in about 6 weeks (around 10/30/2020).         Jannette Simons MD

## 2020-09-30 ENCOUNTER — TELEPHONE (OUTPATIENT)
Dept: NEUROLOGY | Facility: CLINIC | Age: 65
End: 2020-09-30

## 2020-11-05 RX ORDER — CYCLOBENZAPRINE HCL 5 MG
TABLET ORAL
Qty: 30 TABLET | Refills: 1 | OUTPATIENT
Start: 2020-11-05

## 2020-11-13 ENCOUNTER — OFFICE VISIT (OUTPATIENT)
Dept: PULMONOLOGY | Facility: CLINIC | Age: 65
End: 2020-11-13

## 2020-11-13 ENCOUNTER — OFFICE VISIT (OUTPATIENT)
Dept: NEUROLOGY | Facility: CLINIC | Age: 65
End: 2020-11-13

## 2020-11-13 VITALS
BODY MASS INDEX: 25.05 KG/M2 | HEIGHT: 70 IN | OXYGEN SATURATION: 96 % | HEART RATE: 98 BPM | TEMPERATURE: 98.2 F | WEIGHT: 175 LBS | SYSTOLIC BLOOD PRESSURE: 122 MMHG | DIASTOLIC BLOOD PRESSURE: 82 MMHG

## 2020-11-13 VITALS
SYSTOLIC BLOOD PRESSURE: 116 MMHG | HEIGHT: 70 IN | WEIGHT: 172.4 LBS | TEMPERATURE: 97.7 F | OXYGEN SATURATION: 99 % | BODY MASS INDEX: 24.68 KG/M2 | HEART RATE: 95 BPM | DIASTOLIC BLOOD PRESSURE: 82 MMHG

## 2020-11-13 DIAGNOSIS — G25.2 DYSTONIC TREMOR: Primary | ICD-10-CM

## 2020-11-13 DIAGNOSIS — M54.81 CERVICO-OCCIPITAL NEURALGIA: ICD-10-CM

## 2020-11-13 DIAGNOSIS — G47.33 OSA (OBSTRUCTIVE SLEEP APNEA): ICD-10-CM

## 2020-11-13 DIAGNOSIS — J47.9 BRONCHIECTASIS WITHOUT ACUTE EXACERBATION (HCC): ICD-10-CM

## 2020-11-13 DIAGNOSIS — J98.4 RESTRICTIVE LUNG DISEASE: Primary | ICD-10-CM

## 2020-11-13 PROCEDURE — 94618 PULMONARY STRESS TESTING: CPT | Performed by: INTERNAL MEDICINE

## 2020-11-13 PROCEDURE — 99214 OFFICE O/P EST MOD 30 MIN: CPT | Performed by: INTERNAL MEDICINE

## 2020-11-13 PROCEDURE — 64405 NJX AA&/STRD GR OCPL NRV: CPT | Performed by: PSYCHIATRY & NEUROLOGY

## 2020-11-13 PROCEDURE — 94375 RESPIRATORY FLOW VOLUME LOOP: CPT | Performed by: INTERNAL MEDICINE

## 2020-11-13 PROCEDURE — 99212 OFFICE O/P EST SF 10 MIN: CPT | Performed by: PSYCHIATRY & NEUROLOGY

## 2020-11-13 RX ORDER — CYCLOBENZAPRINE HCL 5 MG
5 TABLET ORAL EVERY 8 HOURS PRN
Qty: 30 TABLET | Refills: 0 | Status: SHIPPED | OUTPATIENT
Start: 2020-11-13 | End: 2021-02-08 | Stop reason: SDUPTHER

## 2020-11-13 RX ORDER — MELATONIN
1000 DAILY
COMMUNITY

## 2020-11-13 RX ORDER — IPRATROPIUM BROMIDE AND ALBUTEROL SULFATE 2.5; .5 MG/3ML; MG/3ML
3 SOLUTION RESPIRATORY (INHALATION) 4 TIMES DAILY PRN
Qty: 360 ML | Refills: 3 | Status: SHIPPED | OUTPATIENT
Start: 2020-11-13 | End: 2021-05-27 | Stop reason: ALTCHOICE

## 2020-11-13 NOTE — PROGRESS NOTES
Subjective:    CC: Crystal Carrillo is seen today for Tremors       HPI:  Current visit-patient states that she has had a tremendous improvement in her occipital headaches since her first injection in September.  She has only had 2 headaches since then starting off in the back of her head on the left for which she took a combination of Flexeril 5 mg along with naproxen.  The dystonic tremors in her neck and head have also improved after she received her second Botox last month.  We gave her the second occipital nerve block today.  Procedure note for occipital nerve block-The patient's chart was reviewed.  After obtaining verbal consent the patient was placed in a sitting position with her neck flexed and his chin touching his chest.  Both the left and right occipital areas were prepped with antiseptic solution and injected with bupivacaine 0.5% using a 27-gauge needle.  3 cc of bupivacaine was injected at the site of the greater occipital nerve on each side and 1 cc was injected in the lesser occipital nerve on each side.  Patient tolerated the procedure well.    Last visit-since the last visit patient saw Dr. Isaacs at  for Botox for her dystonic tremor.  Initially that caused her to have pain in her neck and the back of her head but over time that pain has subsided.  She has noticed a very slight improvement in her tremor after the Botox however her headache frequency has worsened as she is now getting about 4 headaches per week in the back of her head radiating to the front on both sides but mainly the right.  Taking over-the-counter medications along with Flexeril does not help much.  Moreover she was given a higher dose of Flexeril 10 mg which makes her extremely drowsy.  Has also started using a BiPAP for her sleep apnea.  I gave her the first occipital nerve block for her occipital headaches in clinic today which she tolerated well.    Initial visit -65-year-old female with a history of hypothyroidism,  obstructive sleep apnea currently not on CPAP, bronchiectasis, left lung resection as a child, symptomatic bradycardia status post pacemaker, scoliosis presents with head tremors and headaches.  As per patient she started having a tremor in her head about 15 years ago.  This worsens when she is tired and is accompanied by pain on the right side of her neck that has become worse in the past year.  Has not noticed any tremors in her hands.  She denies having any family history of tremors.   She had a CT C-spine that showed multilevel degenerative changes with moderate left to right foraminal stenosis at multiple levels..  Underwent physical therapy for 6 weeks that actually made the pain worse.  After that she was prescribed a muscle relaxant Skelaxin which did not help either.  With regards to her headaches patient states that she started having them as a teenager but they have also changed in frequency and characteristic in the past 1 year.  She would mainly have them on the right side of her head occasionally radiating from the back to the front however  has had them all over now about once in 10 days without any nausea, vomiting or photophobia.  Has occasional phonophobia as well as a grinding sensation in her ear.  She takes Flexeril along with naproxen (1 to 2 tablets) which usually helps with the headaches.  Also takes Aleve every few days for her neck pain.  Patient has sleep apnea and was given a CPAP which she was compliant with until she had a Pseudomonas infection and tuberculosis.  Has started seeing a pulmonologist again and will be going for BiPAP soon.  Of note-I personally reviewed her CT of the cervical spine and her most recent labs.      The following portions of the patient's history were reviewed today and updated as of 06/15/2020  : allergies, current medications, past family history, past medical history, past social history, past surgical history and problem list  These document will be  scanned to patient's chart.      Current Outpatient Medications:   •  Acetaminophen (TYLENOL EXTRA STRENGTH PO), Take  by mouth As Needed (When really bad HA and she is not near food- takes a combination of tylenol and sudafed)., Disp: , Rfl:   •  albuterol (PROVENTIL) (2.5 MG/3ML) 0.083% nebulizer solution, Take 2.5 mg by nebulization Every 4 (Four) Hours As Needed., Disp: , Rfl:   •  budesonide (PULMICORT) 0.5 MG/2ML nebulizer solution, Take 0.5 mg by nebulization Daily., Disp: , Rfl:   •  cyclobenzaprine (FLEXERIL) 5 MG tablet, Take 1 tablet by mouth Every 8 (Eight) Hours As Needed for Muscle Spasms., Disp: 30 tablet, Rfl: 0  •  estradiol (ESTRACE) 0.1 MG/GM vaginal cream, Insert 2 g into the vagina Daily., Disp: , Rfl:   •  fluticasone (FLONASE) 50 MCG/ACT nasal spray, 2 sprays into the nostril(s) as directed by provider As Needed for Allergies (Seasonal/location wise-allergic to cat dander)., Disp: , Rfl:   •  levothyroxine sodium (TIROSINT) 100 MCG capsule, Take 112 mcg by mouth Daily., Disp: , Rfl:   •  naproxen sodium (ALEVE) 220 MG tablet, Take 220 mg by mouth 2 (Two) Times a Day As Needed for Mild Pain ., Disp: , Rfl:   •  montelukast (SINGULAIR) 10 MG tablet, Take 10 mg by mouth Every Night. Prn for allergies, Disp: , Rfl:   •  pseudoephedrine (SUDAFED) 30 MG tablet, Take 30 mg by mouth Every 4 (Four) Hours As Needed (When really bad HA and she is not near food- takes a combination of tylenol and sudafed)., Disp: , Rfl:    Past Medical History:   Diagnosis Date   • Allergic rhinitis    • Anxiety    • Cervical disc disease    • COPD (chronic obstructive pulmonary disease) (CMS/HCC)    • Hypothyroid    • PSVT (paroxysmal supraventricular tachycardia) (CMS/HCC)    • PVC's (premature ventricular contractions)    • Sleep apnea    • TB (pulmonary tuberculosis) 2017      Past Surgical History:   Procedure Laterality Date   • APPENDECTOMY     • CHEST SURGERY  1971    thoracoplasty   • LAPAROSCOPIC ASSISTED  "VAGINAL HYSTERECTOMY     • LUNG REMOVAL, TOTAL Left     infant for cyst   • PACEMAKER IMPLANTATION  2015    [facility]; 2008 first one   • UMBILICAL HERNIA REPAIR        Family History   Problem Relation Age of Onset   • Intracerebral hemorrhage Mother    • Glaucoma Father       Social History     Socioeconomic History   • Marital status:      Spouse name: Not on file   • Number of children: 2   • Years of education: Not on file   • Highest education level: Not on file   Occupational History   • Occupation: homemaker/   Tobacco Use   • Smoking status: Never Smoker   • Smokeless tobacco: Never Used   Substance and Sexual Activity   • Alcohol use: Never     Frequency: Never   • Drug use: Never   • Sexual activity: Defer   Social History Narrative     to second      Review of Systems   Constitutional: Negative for fever.   Cardiovascular: Negative for palpitations.   Gastrointestinal: Negative for abdominal pain, nausea and vomiting.   Genitourinary: Negative for urinary incontinence.   Musculoskeletal: Positive for neck pain.   Neurological: Positive for headache. Negative for dizziness, light-headedness and confusion.   All other systems reviewed and are negative.      Objective:    /82   Pulse 95   Temp 97.7 °F (36.5 °C) (Temporal)   Ht 177.8 cm (70\")   Wt 78.2 kg (172 lb 6.4 oz)   LMP  (LMP Unknown)   SpO2 99%   BMI 24.74 kg/m²     Neurology Exam:    General apperance: NAD.     Mental status: Alert, awake and oriented to time place and person.    Recent and Remote memory: Intact.    Attention span and Concentration: Normal.     Language and Speech: Intact- No dysarthria.    Fluency, Naming , Repitition and Comprehension:  Intact    Cranial Nerves:   CN II: Visual fields are full. Intact. Fundi - Normal, No papillederma, Pupils - RENÉE  CN III, IV and VI: Extraocular movements are intact. Normal saccades.   CN V: Facial sensation is intact.   CN VII: " Muscles of facial expression reveal no asymmetry. Intact.   CN VIII: Hearing is intact. Whispered voice intact.   CN IX and X: Palate elevates symmetrically. Intact  CN XI: Shoulder shrug is intact.   CN XII: Tongue is midline without evidence of atrophy or fasciculation.       Motor:-No head tremor noted today.    Right UE muscle strength 5/5. Normal tone.     Left UE muscle strength 5/5. Normal tone.      Right LE muscle strength5/5. Normal tone.     Left LE muscle strength 5/5. Normal tone.      Sensory: Normal light touch, vibration and pinprick sensation bilaterally.    DTRs: 2+ bilaterally in upper and lower extremities.    Babinski: Negative bilaterally.    Co-ordination: Normal finger-to-nose, heel to shin B/L.    Rhomberg: Negative.    Gait: Normal.    Cardiovascular: Regular rate and rhythm without murmur, gallop or rub.    Assessment and Plan:  1. Dystonic tremor  Currently getting Botox with Dr. Isaacs at  because.  Has seen an improvement with just 2 cycles.    2. Cervico-occipital neuralgia  I gave her the second occipital nerve block bilaterally today which she tolerated well  I will see her back in 3 months for her second round of ONB  She can continue taking a combination of Flexeril 5 mg along with naproxen as needed for her headaches       Return in about 3 months (around 2/13/2021).         Jannette Simons MD

## 2020-11-13 NOTE — PROGRESS NOTES
"Crystal Carrillo is a 65 y.o. female here for evaluation of   Chief Complaint   Patient presents with   • Recurrent Pneumonia   • Follow-up       Problem list:  1. Restrictive ventilatory defect  2. H/o left pneumonectomy, infant  3. Bronchiectasis  4. History of Pseudomonas infection  5. H/o primary TB 2017 treated  6. SRINIVAS  7. Seasonal allergic rhinitis  8. Cervical disk disease  9. Dystonic tremor, Botox injection  10. Cervico-occipital neuralgia, nerve block September 18, 2020  11. Hypothyroid  12. SVT  13. Permanent pacemaker, 2008, battery change, 2015  14. Left pneumonectomy, 1956, June  15. Left thoracoplasty, 1971  16. Childbirth x2  17. Appendectomy  18. Vaginal hysterectomy  19. Umbilical hernia repair  20. No drug allergies    History of Present Illness    65-year-old woman, non-smoker with history of left pneumonectomy when she was 7 years old for \"cysts\".  She tested negative for cystic fibrosis.  In 2017 she developed active tuberculosis and received standard treatment.  She then developed a Pseudomonas aeruginosa lung infection.  She gets winded walking up 1 flight of stairs.  She has a known history of sleep apnea for which she uses CPAP.  Because of her severe restrictive defect and persistent fatigue she was changed to BiPAP 12/8.  She was having difficulty with mask fit and leak.  I adjusted her pressures to 10/4.  She was able to see neurology for her dystonic tremor.  They could recommended that she continue to go to  for Botox injections.  She also was noted to have cervico-occipital neuralgia for which she received nerve block with improvement in pain.  She does use her albuterol and it does result in some secretions mobilization.  Currently her secretions are clear.  She does use budesonide 0.5 mg nebulized twice daily.  She uses albuterol with the budesonide.  However after about 4 hours she will be more short of breath and start wheezing.  She has not required antibiotics or steroids.  She " has received her flu vaccine.  She is participating in pulmonary rehabilitation.  The respiratory therapist notes that her oxygen will drop to 84% at around 4 minutes.  She received a Pneumovax 23 in August 2019.  She received her flu vaccine recently and is scheduled for a Prevnar 13 in December of this year.        Review of Systems   Constitutional: Negative for activity change and appetite change.   Eyes: Negative for visual disturbance.   Respiratory: Positive for shortness of breath and wheezing.    Musculoskeletal: Positive for arthralgias, neck pain and neck stiffness.   Neurological: Positive for headaches.         Current Outpatient Medications:   •  Acetaminophen (TYLENOL EXTRA STRENGTH PO), Take  by mouth As Needed (When really bad HA and she is not near food- takes a combination of tylenol and sudafed)., Disp: , Rfl:   •  budesonide (PULMICORT) 0.5 MG/2ML nebulizer solution, Take 0.5 mg by nebulization Daily., Disp: , Rfl:   •  cholecalciferol (VITAMIN D3) 25 MCG (1000 UT) tablet, Take 1,000 Units by mouth Daily., Disp: , Rfl:   •  cyclobenzaprine (FLEXERIL) 5 MG tablet, Take 1 tablet by mouth Every 8 (Eight) Hours As Needed for Muscle Spasms., Disp: 30 tablet, Rfl: 0  •  estradiol (ESTRACE) 0.1 MG/GM vaginal cream, Insert 2 g into the vagina Daily., Disp: , Rfl:   •  levothyroxine sodium (TIROSINT) 100 MCG capsule, Take 100 mcg by mouth Daily., Disp: , Rfl:   •  naproxen sodium (ALEVE) 220 MG tablet, Take 220 mg by mouth 2 (Two) Times a Day As Needed for Mild Pain ., Disp: , Rfl:   •  pseudoephedrine (SUDAFED) 30 MG tablet, Take 30 mg by mouth Every 4 (Four) Hours As Needed (When really bad HA and she is not near food- takes a combination of tylenol and sudafed)., Disp: , Rfl:   •  fluticasone (FLONASE) 50 MCG/ACT nasal spray, 2 sprays into the nostril(s) as directed by provider As Needed for Allergies (Seasonal/location wise-allergic to cat dander)., Disp: , Rfl:   •  ipratropium-albuterol (DUO-NEB)  "0.5-2.5 mg/3 ml nebulizer, Take 3 mL by nebulization 4 (Four) Times a Day As Needed for Wheezing., Disp: 360 mL, Rfl: 3  •  montelukast (SINGULAIR) 10 MG tablet, Take 10 mg by mouth Every Night. Prn for allergies, Disp: , Rfl:     Past Medical History:   Diagnosis Date   • Allergic rhinitis    • Anxiety    • Cervical disc disease    • COPD (chronic obstructive pulmonary disease) (CMS/[facility])    • Hypothyroid    • PSVT (paroxysmal supraventricular tachycardia) (CMS/[facility])    • PVC's (premature ventricular contractions)    • Sleep apnea    • TB (pulmonary tuberculosis) 2017     Past Surgical History:   Procedure Laterality Date   • APPENDECTOMY     • CHEST SURGERY  1971    thoracoplasty   • LAPAROSCOPIC ASSISTED VAGINAL HYSTERECTOMY     • LUNG REMOVAL, TOTAL Left     infant for cyst   • PACEMAKER IMPLANTATION  2015    [facility]; 2008 first one   • UMBILICAL HERNIA REPAIR       Social History     Socioeconomic History   • Marital status:      Spouse name: Not on file   • Number of children: 2   • Years of education: Not on file   • Highest education level: Not on file   Occupational History   • Occupation: homemaker/   Tobacco Use   • Smoking status: Never Smoker   • Smokeless tobacco: Never Used   Substance and Sexual Activity   • Alcohol use: Never     Frequency: Never   • Drug use: Never   • Sexual activity: Defer   Social History Narrative     to second      Family History   Problem Relation Age of Onset   • Intracerebral hemorrhage Mother    • Glaucoma Father      Blood pressure 122/82, pulse 98, temperature 98.2 °F (36.8 °C), height 177.8 cm (70\"), weight 79.4 kg (175 lb), SpO2 96 %    Physical Exam  Constitutional:       Appearance: She is normal weight.   HENT:      Head: Normocephalic and atraumatic.      Nose:      Comments: masked     Mouth/Throat:      Mouth: Mucous membranes are dry.   Eyes:      Pupils: Pupils are equal, round, and reactive to light. "   Cardiovascular:      Rate and Rhythm: Normal rate and regular rhythm.      Heart sounds: Normal heart sounds.   Pulmonary:      Comments: proloned expiration without wheeze or rhonchi. scoliosis  Musculoskeletal:      Right lower leg: No edema.      Left lower leg: No edema.   Skin:     General: Skin is warm and dry.   Neurological:      Mental Status: She is alert.           PFTs:  May 8, 2020, FVC 1.32 L, 35%, FEV1 0.87 L, 30% ratio 66%, total lung capacity 2.46 L, 42%, diffusion capacity 8.4, 38%    6-minute walk test, May 8, 2020, resting saturation 97%, after 5 minutes she dropped to 89% and walked 1200 feet    November 13, 2020, FVC 1.15 L, 30%, FEV1 0.80 L, 27% ratio 70% consistent with severe restriction.    6-minute walk test today resting saturation is 96% but within 2 minutes she was down to 84%.  2 L placed in oxygen improved to 92% but again dropped to 87% and she was increased to 3 L.  3 L did keep her at 89%    Radiology:     Lab:  November 11, 2020, TSH 0.01, free T4 1.40    Diagnoses and all orders for this visit:    1. Restrictive lung disease; chest wall deformity (Primary)    2. Bronchiectasis without acute exacerbation (CMS/[facility])    3. SRINIVAS (obstructive sleep apnea)    Other orders  -     ipratropium-albuterol (DUO-NEB) 0.5-2.5 mg/3 ml nebulizer; Take 3 mL by nebulization 4 (Four) Times a Day As Needed for Wheezing.  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  Her vital capacity hovers between 30 and 35%.  Amazingly her resting room air saturation is 96 to 99%.  However she drops quickly with exertion.  I am very excited that she is tolerating BiPAP well even at fairly low pressures 10/4 I suspect that she is getting some benefit in ventilation as well as relief of atelectasis.  She has good symptom control from her sleep apnea as well.  She does have chronic mucoid secretions  secondary to bronchiectasis.  She is mobilizing these with her nebulizer.  Fortunately she has not had an acute exacerbation requiring steroids or antibiotics.    Change albuterol to ipratropium bromide-albuterol twice daily and up to 4 times daily as needed for wheezing and cough    Continue budesonide 0.5 mg nebulized twice daily    I did discuss adding a long-acting bronchodilator, PerforomJennifer leonardo but decision was made to stay with the short acting agent    Concur with her vaccine schedule, Prevnar 13 in December, annual flu vaccines, Pneumovax 23 every 5 years    Arrange portable oxygen to use with exercise.  I think she needs a portable concentrator that will go up to at least 6 L because I anticipate her need to increase over time.  I would like her to actually start with 4 L of oxygen that might allow her to exercise for a longer duration and improve her cardiovascular fitness.    Continue pulmonary rehabilitation and if possible the maintenance program        Susan Long MD

## 2021-02-08 ENCOUNTER — OFFICE VISIT (OUTPATIENT)
Dept: NEUROLOGY | Facility: CLINIC | Age: 66
End: 2021-02-08

## 2021-02-08 VITALS
HEART RATE: 62 BPM | SYSTOLIC BLOOD PRESSURE: 118 MMHG | WEIGHT: 175 LBS | TEMPERATURE: 96.8 F | OXYGEN SATURATION: 96 % | DIASTOLIC BLOOD PRESSURE: 88 MMHG | BODY MASS INDEX: 25.05 KG/M2 | HEIGHT: 70 IN

## 2021-02-08 DIAGNOSIS — G25.2 DYSTONIC TREMOR: Primary | ICD-10-CM

## 2021-02-08 DIAGNOSIS — M54.2 NECK PAIN: ICD-10-CM

## 2021-02-08 DIAGNOSIS — M54.81 CERVICO-OCCIPITAL NEURALGIA: ICD-10-CM

## 2021-02-08 PROCEDURE — 64450 NJX AA&/STRD OTHER PN/BRANCH: CPT | Performed by: PSYCHIATRY & NEUROLOGY

## 2021-02-08 PROCEDURE — 64405 NJX AA&/STRD GR OCPL NRV: CPT | Performed by: PSYCHIATRY & NEUROLOGY

## 2021-02-08 PROCEDURE — 99214 OFFICE O/P EST MOD 30 MIN: CPT | Performed by: PSYCHIATRY & NEUROLOGY

## 2021-02-08 RX ORDER — CYCLOBENZAPRINE HCL 5 MG
7.5 TABLET ORAL NIGHTLY
Qty: 45 TABLET | Refills: 3 | Status: SHIPPED | OUTPATIENT
Start: 2021-02-08 | End: 2021-06-07

## 2021-02-08 NOTE — PROGRESS NOTES
Subjective:    CC: Crystal Carrillo is seen today for Tremors       HPI:  Current visit-since the last visit patient states she has been having constant neck pain and stiffness that sometimes leads to an occipital headache.  Has had about 3-5 headaches in the past 3 months.  The pain in her neck is mainly while carrying out tasks at home such as washing dishes.  She takes Flexeril 5 mg a few hours before bedtime as needed.  She is also unable to bend her neck while eating food and has to bring her plate up to her mouth.  She had Botox for her dystonic tremor about 2-1/2 weeks ago and was told by Dr. Isaacs not to wear her cervical collar.  I gave her her third occipital nerve block today which she tolerated well  Procedure note for occipital nerve block-The patient's chart was reviewed.  After obtaining verbal consent the patient was placed in a sitting position with her neck flexed and her chin touching his chest.  Both the left and right occipital areas were prepped with antiseptic solution and injected with bupivacaine 0.5% using a 27-gauge needle.  3 cc of bupivacaine was injected at the site of the greater occipital nerve on each side and 2 cc was injected in the lesser occipital nerve on each side.  Patient tolerated the procedure well.    Last visit- patient states that she has had a tremendous improvement in her occipital headaches since her first injection in September.  She has only had 2 headaches since then starting off in the back of her head on the left for which she took a combination of Flexeril 5 mg along with naproxen.  The dystonic tremors in her neck and head have also improved after she received her second Botox last month.  We gave her the second occipital nerve block today.    Last visit-since the last visit patient saw Dr. Isaacs at  for Botox for her dystonic tremor.  Initially that caused her to have pain in her neck and the back of her head but over time that pain has subsided.  She has  noticed a very slight improvement in her tremor after the Botox however her headache frequency has worsened as she is now getting about 4 headaches per week in the back of her head radiating to the front on both sides but mainly the right.  Taking over-the-counter medications along with Flexeril does not help much.  Moreover she was given a higher dose of Flexeril 10 mg which makes her extremely drowsy.  Has also started using a BiPAP for her sleep apnea.  I gave her the first occipital nerve block for her occipital headaches in clinic today which she tolerated well.    Initial visit -65-year-old female with a history of hypothyroidism, obstructive sleep apnea currently not on CPAP, bronchiectasis, left lung resection as a child, symptomatic bradycardia status post pacemaker, scoliosis presents with head tremors and headaches.  As per patient she started having a tremor in her head about 15 years ago.  This worsens when she is tired and is accompanied by pain on the right side of her neck that has become worse in the past year.  Has not noticed any tremors in her hands.  She denies having any family history of tremors.   She had a CT C-spine that showed multilevel degenerative changes with moderate left to right foraminal stenosis at multiple levels..  Underwent physical therapy for 6 weeks that actually made the pain worse.  After that she was prescribed a muscle relaxant Skelaxin which did not help either.  With regards to her headaches patient states that she started having them as a teenager but they have also changed in frequency and characteristic in the past 1 year.  She would mainly have them on the right side of her head occasionally radiating from the back to the front however  has had them all over now about once in 10 days without any nausea, vomiting or photophobia.  Has occasional phonophobia as well as a grinding sensation in her ear.  She takes Flexeril along with naproxen (1 to 2 tablets) which  usually helps with the headaches.  Also takes Aleve every few days for her neck pain.  Patient has sleep apnea and was given a CPAP which she was compliant with until she had a Pseudomonas infection and tuberculosis.  Has started seeing a pulmonologist again and will be going for BiPAP soon.  Of note-I personally reviewed her CT of the cervical spine and her most recent labs.      The following portions of the patient's history were reviewed today and updated as of 06/15/2020  : allergies, current medications, past family history, past medical history, past social history, past surgical history and problem list  These document will be scanned to patient's chart.      Current Outpatient Medications:   •  Acetaminophen (TYLENOL EXTRA STRENGTH PO), Take  by mouth As Needed (When really bad HA and she is not near food- takes a combination of tylenol and sudafed)., Disp: , Rfl:   •  cholecalciferol (VITAMIN D3) 25 MCG (1000 UT) tablet, Take 1,000 Units by mouth Daily., Disp: , Rfl:   •  estradiol (ESTRACE) 0.1 MG/GM vaginal cream, Insert 2 g into the vagina Daily., Disp: , Rfl:   •  ipratropium-albuterol (DUO-NEB) 0.5-2.5 mg/3 ml nebulizer, Take 3 mL by nebulization 4 (Four) Times a Day As Needed for Wheezing., Disp: 360 mL, Rfl: 3  •  levothyroxine sodium (TIROSINT) 100 MCG capsule, Take 100 mcg by mouth Daily., Disp: , Rfl:   •  montelukast (SINGULAIR) 10 MG tablet, Take 10 mg by mouth Every Night. Prn for allergies, Disp: , Rfl:   •  naproxen sodium (ALEVE) 220 MG tablet, Take 220 mg by mouth 2 (Two) Times a Day As Needed for Mild Pain ., Disp: , Rfl:   •  pseudoephedrine (SUDAFED) 30 MG tablet, Take 30 mg by mouth Every 4 (Four) Hours As Needed (When really bad HA and she is not near food- takes a combination of tylenol and sudafed)., Disp: , Rfl:   •  budesonide (PULMICORT) 0.5 MG/2ML nebulizer solution, Take 0.5 mg by nebulization Daily., Disp: , Rfl:   •  cyclobenzaprine (FLEXERIL) 5 MG tablet, Take 1.5 tablets by  mouth Every Night., Disp: 45 tablet, Rfl: 3  •  fluticasone (FLONASE) 50 MCG/ACT nasal spray, 2 sprays into the nostril(s) as directed by provider As Needed for Allergies (Seasonal/location wise-allergic to cat dander)., Disp: , Rfl:    Past Medical History:   Diagnosis Date   • Allergic rhinitis    • Anxiety    • Cervical disc disease    • COPD (chronic obstructive pulmonary disease) (CMS/HCC)    • Hypothyroid    • PSVT (paroxysmal supraventricular tachycardia) (CMS/[facility])    • PVC's (premature ventricular contractions)    • Sleep apnea    • TB (pulmonary tuberculosis) 2017      Past Surgical History:   Procedure Laterality Date   • APPENDECTOMY     • CHEST SURGERY  1971    thoracoplasty   • LAPAROSCOPIC ASSISTED VAGINAL HYSTERECTOMY     • LUNG REMOVAL, TOTAL Left     infant for cyst   • PACEMAKER IMPLANTATION  2015    [facility]; [facility] first one   • UMBILICAL HERNIA REPAIR        Family History   Problem Relation Age of Onset   • Intracerebral hemorrhage Mother    • Glaucoma Father       Social History     Socioeconomic History   • Marital status:      Spouse name: Not on file   • Number of children: 2   • Years of education: Not on file   • Highest education level: Not on file   Occupational History   • Occupation: homemaker/   Tobacco Use   • Smoking status: Never Smoker   • Smokeless tobacco: Never Used   Substance and Sexual Activity   • Alcohol use: Never     Frequency: Never   • Drug use: Never   • Sexual activity: Defer   Social History Narrative     to second      Review of Systems   Constitutional: Negative for fever.   Cardiovascular: Negative for palpitations.   Gastrointestinal: Negative for abdominal pain, nausea and vomiting.   Genitourinary: Negative for urinary incontinence.   Musculoskeletal: Positive for neck pain and neck stiffness.   Neurological: Positive for headache. Negative for dizziness, light-headedness and confusion.   All other systems  "reviewed and are negative.      Objective:    /88   Pulse 62   Temp 96.8 °F (36 °C)   Ht 177.8 cm (70\")   Wt 79.4 kg (175 lb)   LMP  (LMP Unknown)   SpO2 96%   BMI 25.11 kg/m²     Neurology Exam:    General apperance: NAD.     Mental status: Alert, awake and oriented to time place and person.    Recent and Remote memory: Intact.    Attention span and Concentration: Normal.     Language and Speech: Intact- No dysarthria.    Fluency, Naming , Repitition and Comprehension:  Intact    Cranial Nerves:   CN II: Visual fields are full. Intact. Fundi - Normal, No papillederma, Pupils - RENÉE  CN III, IV and VI: Extraocular movements are intact. Normal saccades.   CN V: Facial sensation is intact.   CN VII: Muscles of facial expression reveal no asymmetry. Intact.   CN VIII: Hearing is intact. Whispered voice intact.   CN IX and X: Palate elevates symmetrically. Intact  CN XI: Shoulder shrug is intact.   CN XII: Tongue is midline without evidence of atrophy or fasciculation.       Motor:-No head tremor noted today.    Right UE muscle strength 5/5. Normal tone.     Left UE muscle strength 5/5. Normal tone.      Right LE muscle strength5/5. Normal tone.     Left LE muscle strength 5/5. Normal tone.      Sensory: Normal light touch, vibration and pinprick sensation bilaterally.    DTRs: 2+ bilaterally in upper and lower extremities.    Babinski: Negative bilaterally.    Co-ordination: Normal finger-to-nose, heel to shin B/L.    Rhomberg: Negative.    Gait: Normal.    Cardiovascular: Regular rate and rhythm without murmur, gallop or rub.    Assessment and Plan:  1. Dystonic tremor  Currently getting Botox with Dr. Isaacs at [facility] because.     2. Cervico-occipital neuralgia  I gave her the third occipital nerve block bilaterally today which she tolerated well  I will see her back in 3 months for her nextround of ONB    3.  Neck pain/stiffness  I have told her to take Flexeril 5 mg every night and to take an additional " dose of 2.5 mg in the morning as needed  I will also give her a PT referral       Return in about 3 months (around 5/8/2021).     I spent 35 minutes with the patient today out of which over 20 minutes were spent in answering her questions regarding her neck pain/stiffness and also giving her the occipital nerve block.    Jannette Simons MD

## 2021-04-09 ENCOUNTER — TELEPHONE (OUTPATIENT)
Dept: NEUROLOGY | Facility: CLINIC | Age: 66
End: 2021-04-09

## 2021-04-09 NOTE — TELEPHONE ENCOUNTER
Provider: DR. ADAM  Caller: JAZLYN  Relationship to Patient: PT  Pharmacy: [facility]  Phone Number: [phone]  Reason for Call: PT STATES THAT SHE IS GETTING HEADACHES AGAIN, PT STATES THAT SHE HAS BEEN USING THE FLEXERIL FOR HER HEADACHES AND SHE'S BEEN FIGHTING THEM SINCE LAST WEEKEND. PT STATES THAT THEY HAVE BEEN PRETTY MUCH EVERYDAY.   When was the patient last seen: 2/8/2021  When did it start: THIS PAST WEEKEND   Where is it located: FROM BASE OF NECK UP OVER THE SKULL, BEHIND EYE  DRIVING AGGRAVATES HER HEADACHE.   PT HAS BEEN TAKING FLEXERIL AND STATES IF SHE TAKES IT BEFORE HEADACHE IT USUALLY WILL GO AWAY. PT HAS ALSO BEEN USING TYLENOL AND ASPRIN.   PHYSICAL THERAPY ALSO SEEMS TO BE HELPING.

## 2021-04-09 NOTE — TELEPHONE ENCOUNTER
Can you call the patient to see if she can come at 11:30 on Monday for an occipital nerve block.  She may be getting the headaches as the effects of the nerve block may have gone away.

## 2021-04-14 ENCOUNTER — OFFICE VISIT (OUTPATIENT)
Dept: NEUROLOGY | Facility: CLINIC | Age: 66
End: 2021-04-14

## 2021-04-14 VITALS
SYSTOLIC BLOOD PRESSURE: 120 MMHG | WEIGHT: 173.2 LBS | HEIGHT: 70 IN | BODY MASS INDEX: 24.79 KG/M2 | DIASTOLIC BLOOD PRESSURE: 88 MMHG | TEMPERATURE: 97.8 F

## 2021-04-14 DIAGNOSIS — G25.2 DYSTONIC TREMOR: Primary | ICD-10-CM

## 2021-04-14 DIAGNOSIS — M54.81 CERVICO-OCCIPITAL NEURALGIA: ICD-10-CM

## 2021-04-14 PROCEDURE — 64450 NJX AA&/STRD OTHER PN/BRANCH: CPT | Performed by: PSYCHIATRY & NEUROLOGY

## 2021-04-14 PROCEDURE — 64405 NJX AA&/STRD GR OCPL NRV: CPT | Performed by: PSYCHIATRY & NEUROLOGY

## 2021-04-14 RX ORDER — BUPIVACAINE HYDROCHLORIDE 2.5 MG/ML
10 INJECTION, SOLUTION INFILTRATION; PERINEURAL ONCE
Status: COMPLETED | OUTPATIENT
Start: 2021-04-14 | End: 2021-04-14

## 2021-04-14 RX ADMIN — BUPIVACAINE HYDROCHLORIDE 10 ML: 2.5 INJECTION, SOLUTION INFILTRATION; PERINEURAL at 12:36

## 2021-04-14 NOTE — PROGRESS NOTES
Procedure note for occipital nerve block-The patient's chart was reviewed.  After obtaining verbal consent the patient was placed in a sitting position with her neck flexed and her chin touching his chest.  Both the left and right occipital areas were prepped with antiseptic solution and injected with bupivacaine 0.5% using a 27-gauge needle.  3 cc of bupivacaine was injected at the site of the greater occipital nerve on each side and 2 cc was injected in the lesser occipital nerve on each side.  Patient tolerated the procedure well.    Of note-patient has started a few occipital headaches in the past 2 to 3 weeks fluctuating between the left and the right side.  She took Flexeril 7.5 mg as needed but it only partially relieved the pain.  She will also be seeing Dr. Isaacs today for Botox for her cervical dystonia.  She has started getting PT which has increased her range of motion.  It also helps to hold her head up.

## 2021-04-19 DIAGNOSIS — M54.81 CERVICO-OCCIPITAL NEURALGIA: Primary | ICD-10-CM

## 2021-04-19 RX ORDER — BUPIVACAINE HYDROCHLORIDE 2.5 MG/ML
10 INJECTION, SOLUTION EPIDURAL; INFILTRATION; INTRACAUDAL ONCE
Status: COMPLETED | OUTPATIENT
Start: 2021-04-19 | End: 2021-04-19

## 2021-04-19 RX ADMIN — BUPIVACAINE HYDROCHLORIDE 10 ML: 2.5 INJECTION, SOLUTION EPIDURAL; INFILTRATION; INTRACAUDAL at 16:27

## 2021-05-11 ENCOUNTER — TELEMEDICINE (OUTPATIENT)
Dept: PULMONOLOGY | Facility: CLINIC | Age: 66
End: 2021-05-11

## 2021-05-11 DIAGNOSIS — J30.1 SEASONAL ALLERGIC RHINITIS DUE TO POLLEN: Primary | ICD-10-CM

## 2021-05-11 DIAGNOSIS — G47.33 OSA (OBSTRUCTIVE SLEEP APNEA): ICD-10-CM

## 2021-05-11 DIAGNOSIS — J98.4 RESTRICTIVE LUNG DISEASE: ICD-10-CM

## 2021-05-11 DIAGNOSIS — Z90.2 S/P PNEUMONECTOMY: ICD-10-CM

## 2021-05-11 DIAGNOSIS — J47.9 BRONCHIECTASIS WITHOUT ACUTE EXACERBATION (HCC): ICD-10-CM

## 2021-05-11 PROCEDURE — 99213 OFFICE O/P EST LOW 20 MIN: CPT | Performed by: NURSE PRACTITIONER

## 2021-05-11 RX ORDER — IPRATROPIUM BROMIDE AND ALBUTEROL SULFATE 2.5; .5 MG/3ML; MG/3ML
SOLUTION RESPIRATORY (INHALATION)
Qty: 360 ML | Refills: 3 | OUTPATIENT
Start: 2021-05-11

## 2021-05-11 RX ORDER — ALBUTEROL SULFATE 2.5 MG/3ML
2.5 SOLUTION RESPIRATORY (INHALATION) 4 TIMES DAILY PRN
Qty: 240 ML | Refills: 6 | Status: SHIPPED | OUTPATIENT
Start: 2021-05-11 | End: 2022-05-24

## 2021-05-11 NOTE — PROGRESS NOTES
"[facility]monary Follow up    CHIEF COMPLAINT    Oxygen recertification    HISTORY OF PRESENT ILLNESS    Crystal Carrillo is a 66 y.o.female here today for a telemedicine visit for oxygen recertification.  She is in the process of changing oxygen companies and needed to do a face-to-face before completion of this change.  She denies any rest or illnesses or exacerbations since her last appointment.    She was last seen in the office in November by Dr. Long.  She has a history of a left pneumonectomy when she was 7 years old for \" cysts.\"  She also has a history of TB in 2017 and received standard treatment.    She continues to use her BiPAP, her current settings are 6/4.  She was unable to tolerate 10/4, she felt that this was too high of pressure.  She does feel like she could have an increase in her pressures currently.  She denies any sleeping difficulties.  She does feel better using the BiPAP and has tolerated it well.    She remains on maintenance pulmonary rehabilitation close to her home.  She states that she does try to stay active.  She has been wearing 4 to 6 L with exertion at pulmonary rehab.  She is currently in a [place] with her daughter and has noticed more shortness of breath with exertion.  She did not bring her oxygen with her to travel.    She continues to use budesonide nebulizers twice a day.  She also has been using the DuoNeb's 2 times a day.  She states that the DuoNeb's have caused her voice changes and she has extreme dry mouth.  She would like to be switched back to albuterol nebulizers if possible.    She denies fever, chills, sputum production, hemoptysis, night sweats, weight loss, chest pain or palpitations.  She denies any lower extremity edema or calf tenderness.  She denies any sinus or allergy symptoms.  She denies reflux symptoms.    She is up-to-date on her current vaccinations.  Patient Active Problem List   Diagnosis   • Restrictive lung disease; chest wall " deformity   • S/P pneumonectomy, left infant   • Bronchiectasis without acute exacerbation (CMS/HCC)   • SRINIVAS (obstructive sleep apnea)   • Seasonal allergic rhinitis due to pollen   • Cervico-occipital neuralgia   • Neck pain       No Known Allergies    Current Outpatient Medications:   •  Acetaminophen (TYLENOL EXTRA STRENGTH PO), Take  by mouth As Needed (When really bad HA and she is not near food- takes a combination of tylenol and sudafed)., Disp: , Rfl:   •  albuterol (PROVENTIL) (2.5 MG/3ML) 0.083% nebulizer solution, Take 2.5 mg by nebulization 4 (Four) Times a Day As Needed for Wheezing., Disp: 240 mL, Rfl: 6  •  budesonide (PULMICORT) 0.5 MG/2ML nebulizer solution, Take 0.5 mg by nebulization Daily., Disp: , Rfl:   •  cholecalciferol (VITAMIN D3) 25 MCG (1000 UT) tablet, Take 1,000 Units by mouth Daily., Disp: , Rfl:   •  cyclobenzaprine (FLEXERIL) 5 MG tablet, Take 1.5 tablets by mouth Every Night., Disp: 45 tablet, Rfl: 3  •  estradiol (ESTRACE) 0.1 MG/GM vaginal cream, Insert 2 g into the vagina Daily., Disp: , Rfl:   •  ipratropium-albuterol (DUO-NEB) 0.5-2.5 mg/3 ml nebulizer, Take 3 mL by nebulization 4 (Four) Times a Day As Needed for Wheezing., Disp: 360 mL, Rfl: 3  •  levothyroxine sodium (TIROSINT) 100 MCG capsule, Take 75 mcg by mouth Daily., Disp: , Rfl:   •  montelukast (SINGULAIR) 10 MG tablet, Take 10 mg by mouth Every Night. Prn for allergies, Disp: , Rfl:   •  naproxen sodium (ALEVE) 220 MG tablet, Take 220 mg by mouth 2 (Two) Times a Day As Needed for Mild Pain ., Disp: , Rfl:   •  pseudoephedrine (SUDAFED) 30 MG tablet, Take 30 mg by mouth Every 4 (Four) Hours As Needed (When really bad HA and she is not near food- takes a combination of tylenol and sudafed)., Disp: , Rfl:   MEDICATION LIST AND ALLERGIES REVIEWED.    Social History     Tobacco Use   • Smoking status: Never Smoker   • Smokeless tobacco: Never Used   Substance Use Topics   • Alcohol use: Never   • Drug use: Never        FAMILY AND SOCIAL HISTORY REVIEWED.    Review of Systems   Constitutional: Positive for fatigue. Negative for activity change, appetite change, fever and unexpected weight change.   HENT: Negative for congestion, postnasal drip, rhinorrhea, sinus pressure, sore throat and voice change.    Eyes: Negative for visual disturbance.   Respiratory: Positive for shortness of breath. Negative for cough, chest tightness and wheezing.    Cardiovascular: Negative for chest pain, palpitations and leg swelling.   Gastrointestinal: Negative for abdominal distention, abdominal pain, nausea and vomiting.   Endocrine: Negative for cold intolerance and heat intolerance.   Genitourinary: Negative for difficulty urinating and urgency.   Musculoskeletal: Negative for arthralgias, back pain and neck pain.   Skin: Negative for color change and pallor.   Allergic/Immunologic: Negative for environmental allergies and food allergies.   Neurological: Negative for dizziness, syncope, weakness and light-headedness.   Hematological: Negative for adenopathy. Does not bruise/bleed easily.   Psychiatric/Behavioral: Negative for agitation and behavioral problems.   .    LMP  (LMP Unknown)     Immunization History   Administered Date(s) Administered   • COVID-19 (MODERNA) 02/25/2021, 03/25/2021   • Flulaval/Fluarix/Fluzone Quad 10/10/2019   • Hepatitis A 01/28/2019   • Influenza, Unspecified 11/02/2020   • Pneumococcal Polysaccharide (PPSV23) 10/01/2019       Unable to do physical exam due to telemedicine visit, patient appeared to be in no respiratory distress throughout the entire visit.      RESULTS      PROBLEM LIST    Problem List Items Addressed This Visit        Allergies and Adverse Reactions    Seasonal allergic rhinitis due to pollen - Primary       Pulmonary and Pneumonias    Restrictive lung disease; chest wall deformity    Relevant Medications    albuterol (PROVENTIL) (2.5 MG/3ML) 0.083% nebulizer solution    S/P pneumonectomy, left  infant    Bronchiectasis without acute exacerbation (CMS/[facility])    Relevant Medications    albuterol (PROVENTIL) (2.5 MG/3ML) 0.083% nebulizer solution       Sleep    SRINIVAS (obstructive sleep apnea)            DISCUSSION  You have chosen to receive care through a telehealth visit.  Do you consent to use a video/audio connection for your medical care today? Yes    Ms. Carrillo was here for a telemedicine visit.  She is switching oxygen companies and I will send over new orders for her to have a portable concentrator at 5 L pulse dose to use with activity.  She will continue to wear this with pulmonary rehabilitation as well.  She does benefit from her oxygen therapy.    She will continue to wear her BiPAP every night.  I did review her BiPAP download and she is compliant with an average of 3 hours and 47 minutes.  I did advise her to try to wear this more than 4 hours every night if possible.  She does benefit from her BiPAP and we will increase her settings to 8/4.  I will send this new order to her DME company today.    We will switch her back to albuterol nebulizers instead of DuoNeb's.  I did send these over to her local pharmacy.  She will continue to use the budesonide nebulizers twice a day.    I did encourage her to continue doing the maintenance pulmonary rehabilitation in her hometown.  She seems to be doing fairly well from a pulmonary standpoint currently.    She will follow-up with Dr. Long as previously scheduled for later this month.  Did advise her to call with any additional concerns or questions.    Level of service justified based on 20 minutes spent in patient care on this date of service including, but not limited to: preparing to see the patient, obtaining and/or reviewing history, performing medically appropriate examination, ordering tests/medicine/procedures, independently interpreting results, documenting clinical information in EHR, and counseling/education of patient/family/caregiver.  (Level 4 30-39 minutes; Level 5 40-54 minutes)      LOTTIE Blake  05/11/202112:40 EDT  Electronically signed     Please note that portions of this note were completed with a voice recognition program. Efforts were made to edit the dictations, but occasionally words are mistranscribed.      CC: Rhina Tomas, DO

## 2021-05-25 NOTE — PROGRESS NOTES
"Crystal Carrillo is a 66 y.o. female here for evaluation of   Chief Complaint   Patient presents with   • Restrictive Lung Disease     f/u       Problem list:  1. Restrictive ventilatory defect  2. H/o left pneumonectomy, infant  3. Bronchiectasis  4. History of Pseudomonas infection  5. H/o primary TB 2017 treated  6. SRINIVAS  7. Exercise-induced desaturation.  8. Raynauld's disease  9. Seasonal allergic rhinitis  10. Cervical disk disease  11. Dystonic tremor, Botox injection  12. Cervico-occipital neuralgia, nerve block September 18, 2020  13. Hypothyroid  14. SVT  15. Permanent pacemaker, 2008, battery change, 2015  16. Left pneumonectomy, 1956, June  17. Left thoracoplasty, 1971  18. Childbirth x2  19. Appendectomy  20. Vaginal hysterectomy  21. Umbilical hernia repair  22. No drug allergies    History of Present Illness    65-year-old woman, non-smoker with a history of left pneumonectomy when she was 7 years of age for \"cysts\".  She has been tested for cystic fibrosis and is negative.  In 2017 she developed active tuberculosis and received standard treatment.  She then developed a Pseudomonas aeruginosa lung infection.  She has a known history of sleep apnea for which he uses BiPAP 6/4.  She was referred to pulmonary rehabilitation where they noted exercise-induced desaturation.  She is wearing supplemental oxygen 4 to 6 L with exertion.  She is currently using her albuteral 2-4 times per day and budesonide twice daily.  She had a televisit with my APRN and was transition from DuoNeb back to plain albuterol.  Budesonide was continued twice daily.  She is undergoing maintenance pulmonary rehabilitation in her hometown.  She desaturates with walking, and needs 6 liters to maintain SaO2. She has a chronic sputum productive of cloudy mucous.  She denies hemoptysis. Currently having trouble wearing bipap. She is not sure why she is having trouble. She feels like it blows too much.   She denies reflux symptoms. She is not " having allergy symptoms currently.  She has received both COVID-19 vaccines      Review of Systems   Constitutional: Negative for activity change.   HENT: Negative.    Eyes: Negative.    Respiratory: Positive for apnea, cough and shortness of breath.    Cardiovascular: Negative.    Gastrointestinal: Negative.    Endocrine: Negative.    Genitourinary: Negative.    Musculoskeletal: Positive for neck pain.   Skin: Negative.    Allergic/Immunologic: Negative.    Neurological: Positive for tremors.   Hematological: Negative.    Psychiatric/Behavioral: Negative.          Current Outpatient Medications:   •  Acetaminophen (TYLENOL EXTRA STRENGTH PO), Take  by mouth As Needed (When really bad HA and she is not near food- takes a combination of tylenol and sudafed)., Disp: , Rfl:   •  albuterol (PROVENTIL) (2.5 MG/3ML) 0.083% nebulizer solution, Take 2.5 mg by nebulization 4 (Four) Times a Day As Needed for Wheezing., Disp: 240 mL, Rfl: 6  •  budesonide (PULMICORT) 0.5 MG/2ML nebulizer solution, Take 0.5 mg by nebulization Daily., Disp: , Rfl:   •  cholecalciferol (VITAMIN D3) 25 MCG (1000 UT) tablet, Take 1,000 Units by mouth Daily., Disp: , Rfl:   •  cyclobenzaprine (FLEXERIL) 5 MG tablet, Take 1.5 tablets by mouth Every Night., Disp: 45 tablet, Rfl: 3  •  estradiol (ESTRACE) 0.1 MG/GM vaginal cream, Insert 2 g into the vagina Daily., Disp: , Rfl:   •  ipratropium-albuterol (DUO-NEB) 0.5-2.5 mg/3 ml nebulizer, Take 3 mL by nebulization 4 (Four) Times a Day As Needed for Wheezing., Disp: 360 mL, Rfl: 3  •  montelukast (SINGULAIR) 10 MG tablet, Take 10 mg by mouth Every Night. Prn for allergies, Disp: , Rfl:   •  naproxen sodium (ALEVE) 220 MG tablet, Take 220 mg by mouth 2 (Two) Times a Day As Needed for Mild Pain ., Disp: , Rfl:   •  pseudoephedrine (SUDAFED) 30 MG tablet, Take 30 mg by mouth Every 4 (Four) Hours As Needed (When really bad HA and she is not near food- takes a combination of tylenol and sudafed)., Disp: ,  "Rfl:   •  Synthroid 75 MCG tablet, Take 75 mcg by mouth Daily., Disp: , Rfl:   •  levothyroxine sodium (TIROSINT) 100 MCG capsule, Take 75 mcg by mouth Daily., Disp: , Rfl:     Past Medical History:   Diagnosis Date   • Allergic rhinitis    • Anxiety    • Cervical disc disease    • COPD (chronic obstructive pulmonary disease) (CMS/HCC)    • Hypothyroid    • PSVT (paroxysmal supraventricular tachycardia) (CMS/HCC)    • PVC's (premature ventricular contractions)    • Sleep apnea    • TB (pulmonary tuberculosis) 2017     Past Surgical History:   Procedure Laterality Date   • APPENDECTOMY     • CHEST SURGERY  1971    thoracoplasty   • LAPAROSCOPIC ASSISTED VAGINAL HYSTERECTOMY     • LUNG REMOVAL, TOTAL Left     infant for cyst   • PACEMAKER IMPLANTATION  2015    [facility]; [facility] first one   • UMBILICAL HERNIA REPAIR       Social History     Socioeconomic History   • Marital status:      Spouse name: Not on file   • Number of children: 2   • Years of education: Not on file   • Highest education level: Not on file   Tobacco Use   • Smoking status: Never Smoker   • Smokeless tobacco: Never Used   Substance and Sexual Activity   • Alcohol use: Never   • Drug use: Never   • Sexual activity: Defer     Family History   Problem Relation Age of Onset   • Intracerebral hemorrhage Mother    • Glaucoma Father      Blood pressure 116/84, pulse 64, temperature 98.2 °F (36.8 °C), resp. rate 16, height 177.8 cm (70\"), weight 76.7 kg (169 lb 2 oz), SpO2 100 %,     Physical Exam  Constitutional:       General: She is not in acute distress.  HENT:      Head: Normocephalic and atraumatic.      Nose:      Comments: masked  Eyes:      Extraocular Movements: Extraocular movements intact.      Pupils: Pupils are equal, round, and reactive to light.   Cardiovascular:      Rate and Rhythm: Normal rate and regular rhythm.      Heart sounds: Normal heart sounds. No murmur heard.     Pulmonary:      Comments: Diminished expansion left " chest. Right lung clear  Abdominal:      General: Bowel sounds are normal. There is no distension.      Palpations: Abdomen is soft.      Tenderness: There is no abdominal tenderness.   Musculoskeletal:      Right lower leg: No edema.      Left lower leg: No edema.   Lymphadenopathy:      Cervical: No cervical adenopathy.   Skin:     General: Skin is warm and dry.   Neurological:      Mental Status: She is alert and oriented to person, place, and time.   Psychiatric:         Mood and Affect: Mood normal.           PFTs:  November 13, 2020, FVC 1.15 L, 30%, FEV1 0.80 L, 27% ratio 70%, severe restriction    May 8, 2020, FVC 1.32 L, 35%, FEV1 0.87 L, 30% ratio 66%, total lung capacity 2.46 L, 42%, diffusion capacity 8.4, 38%, severe restriction and diffusion impairment    Sleep study in 2012 revealed an apnea-hypopnea index of 35 during REM sleep, 10 during non-REM sleep    Radiology:    Lab:    Diagnoses and all orders for this visit:    1. Restrictive lung disease; chest wall deformity (Primary)    2. SRINIVAS (obstructive sleep apnea)    3. Bronchiectasis without complication (CMS/HCC)    4. S/P pneumonectomy, left infant        Discussion:     #1 restrictive lung disease secondary to chest wall deformity and pneumonectomy.  Vital capacity is only 30%.  Amazingly her resting room air saturation is 100%.  Unfortunately she gets severe exercise-induced desaturation.  She has actively participated in pulmonary rehabilitation and requires 6 L to maintain an adequate oxygen saturation while walking on the treadmill.    #2 obstructive sleep apnea, significantly worse during REM sleep, currently on BiPAP 10/4 with a nasal pillow mask.  She is having some leaking from her mouth and might benefit from the dream works mass that goes under the nose and covers the mouth but not over the bridge of the nose.    #3 bronchiectasis she continues to have chronic mucoid secretions but no hemoptysis or acute infection requiring  antibiotics    #4 history of pneumonectomy, left as an infant for undiagnosed cystic disease.  She has had evaluations that were negative for cystic fibrosis    Budesonide 0.5 mg nebulized twice daily, decrease to once daily if hoarseness persists    Albuterol nebulized 2-4 times daily    Singulair is currently on hold because she is having minimal allergy symptoms but she can restart it when symptoms worsen    Continue BiPAP 10/4, attempt to change to the DreamWear mask the fits under the nose and covers the mouth    She is in need of a portable oxygen concentrator given her relative young age and good activity level.  It is difficult to find concentrators that will deliver 6 L.  My RT will reach out to her company and attempt to get 6 L portable concentrator that will at least deliver pulse dose.  She does not need supplemental oxygen while flying but needs it for any mobility to the airplane in getting off of the airplane.    Follow-up in 6 months with chest x-ray and spirometry    Susan Long MD

## 2021-05-27 ENCOUNTER — OFFICE VISIT (OUTPATIENT)
Dept: PULMONOLOGY | Facility: CLINIC | Age: 66
End: 2021-05-27

## 2021-05-27 ENCOUNTER — OFFICE VISIT (OUTPATIENT)
Dept: NEUROLOGY | Facility: CLINIC | Age: 66
End: 2021-05-27

## 2021-05-27 VITALS
HEIGHT: 70 IN | HEART RATE: 64 BPM | RESPIRATION RATE: 16 BRPM | OXYGEN SATURATION: 100 % | SYSTOLIC BLOOD PRESSURE: 116 MMHG | WEIGHT: 169.13 LBS | BODY MASS INDEX: 24.21 KG/M2 | TEMPERATURE: 98.2 F | DIASTOLIC BLOOD PRESSURE: 84 MMHG

## 2021-05-27 VITALS
BODY MASS INDEX: 24.22 KG/M2 | DIASTOLIC BLOOD PRESSURE: 80 MMHG | SYSTOLIC BLOOD PRESSURE: 114 MMHG | HEIGHT: 70 IN | WEIGHT: 169.2 LBS | TEMPERATURE: 97.1 F

## 2021-05-27 DIAGNOSIS — M54.2 NECK PAIN: ICD-10-CM

## 2021-05-27 DIAGNOSIS — M54.81 CERVICO-OCCIPITAL NEURALGIA: Primary | ICD-10-CM

## 2021-05-27 DIAGNOSIS — J98.4 RESTRICTIVE LUNG DISEASE: Primary | ICD-10-CM

## 2021-05-27 DIAGNOSIS — Z90.2 S/P PNEUMONECTOMY: ICD-10-CM

## 2021-05-27 DIAGNOSIS — G47.33 OSA (OBSTRUCTIVE SLEEP APNEA): ICD-10-CM

## 2021-05-27 DIAGNOSIS — J47.9 BRONCHIECTASIS WITHOUT COMPLICATION (HCC): ICD-10-CM

## 2021-05-27 PROCEDURE — 64450 NJX AA&/STRD OTHER PN/BRANCH: CPT | Performed by: PSYCHIATRY & NEUROLOGY

## 2021-05-27 PROCEDURE — 64405 NJX AA&/STRD GR OCPL NRV: CPT | Performed by: PSYCHIATRY & NEUROLOGY

## 2021-05-27 PROCEDURE — 99214 OFFICE O/P EST MOD 30 MIN: CPT | Performed by: INTERNAL MEDICINE

## 2021-05-27 RX ORDER — LEVOTHYROXINE SODIUM 75 MCG
75 TABLET ORAL DAILY
COMMUNITY
Start: 2021-05-21

## 2021-05-27 NOTE — PROGRESS NOTES
Procedure note for occipital nerve block-The patient's chart was reviewed.  After obtaining verbal consent the patient was placed in a sitting position with her neck flexed and her chin touching his chest.  Both the left and right occipital areas were prepped with antiseptic solution and injected with bupivacaine 0.5% using a 27-gauge needle.  3 cc of bupivacaine was injected at the site of the greater occipital nerve on each side and 2 cc was injected in the lesser occipital nerve on each side.  Patient tolerated the procedure well.    Of note-patient has only had 2 severe headaches in the past 6 weeks for which she took Flexeril along with either Tylenol or ibuprofen.  She does have a headache today and I gave her Nurtec to try.  Also gave her a few samples.  She will let me know if she needs a prescription.  She wants to continue doing PT for her neck pain and stiffness and so I will give her another referral.  Will be seeing Dr. Isaacs  in July for her next round of Botox.

## 2021-06-07 ENCOUNTER — TELEPHONE (OUTPATIENT)
Dept: PULMONOLOGY | Facility: CLINIC | Age: 66
End: 2021-06-07

## 2021-06-07 DIAGNOSIS — M54.81 CERVICO-OCCIPITAL NEURALGIA: Primary | ICD-10-CM

## 2021-06-07 RX ORDER — IPRATROPIUM BROMIDE AND ALBUTEROL SULFATE 2.5; .5 MG/3ML; MG/3ML
SOLUTION RESPIRATORY (INHALATION)
Qty: 360 ML | Refills: 3 | Status: SHIPPED | OUTPATIENT
Start: 2021-06-07 | End: 2022-04-28

## 2021-06-07 RX ORDER — CYCLOBENZAPRINE HCL 5 MG
TABLET ORAL
Qty: 45 TABLET | Refills: 3 | Status: SHIPPED | OUTPATIENT
Start: 2021-06-07 | End: 2021-08-27 | Stop reason: SDUPTHER

## 2021-06-07 NOTE — TELEPHONE ENCOUNTER
The patient called and wants to change her DME to [facility]. They already service her with Bi-Pap. I have faxed over the info and order.

## 2021-06-21 DIAGNOSIS — M54.81 CERVICO-OCCIPITAL NEURALGIA: ICD-10-CM

## 2021-06-21 RX ORDER — CYCLOBENZAPRINE HCL 5 MG
TABLET ORAL
Qty: 45 TABLET | Refills: 3 | OUTPATIENT
Start: 2021-06-21

## 2021-06-21 NOTE — TELEPHONE ENCOUNTER
clinical pool.    Caller: Crystal Carrillo    Relationship: Self    Best call back number: [phone]  Medication needed:   Requested Prescriptions      No prescriptions requested or ordered in this encounter       When do you need the refill by: ASAP     What additional details did the patient provide when requesting the medication: PT IS LEAVING IN 06.28.21    Does the patient have less than a 3 day supply:  [] Yes  [x] No    PT CALLED IS LEAVING TOWN AND WILL BE GONE FOR TWO WEEKS , SHE WILL RUN OUT OF RX  BEFORE OFFICE  VISIT.[email or web address].  NEEDS BEFORE LEAVING  ON VACATION.  PHARMACY IS ONLY OPEN FROM 9-5  M-F.  CAN YOU PLEASE CALL IN RX BEFORE I  LEAVE OUT OF TOWN .    PLEASE ADVISE .     What is the patient's preferred pharmacy: [facility] - [address] - [phone]  - [phone]   [phone]

## 2021-07-14 ENCOUNTER — CLINICAL SUPPORT (OUTPATIENT)
Dept: NEUROLOGY | Facility: CLINIC | Age: 66
End: 2021-07-14

## 2021-07-14 VITALS
WEIGHT: 170 LBS | SYSTOLIC BLOOD PRESSURE: 120 MMHG | HEIGHT: 70 IN | BODY MASS INDEX: 24.34 KG/M2 | DIASTOLIC BLOOD PRESSURE: 88 MMHG

## 2021-07-14 DIAGNOSIS — M54.81 CERVICO-OCCIPITAL NEURALGIA: Primary | ICD-10-CM

## 2021-07-14 PROCEDURE — 64405 NJX AA&/STRD GR OCPL NRV: CPT | Performed by: PSYCHIATRY & NEUROLOGY

## 2021-07-14 PROCEDURE — 64450 NJX AA&/STRD OTHER PN/BRANCH: CPT | Performed by: PSYCHIATRY & NEUROLOGY

## 2021-07-14 RX ORDER — BUPIVACAINE HYDROCHLORIDE 2.5 MG/ML
10 INJECTION, SOLUTION EPIDURAL; INFILTRATION; INTRACAUDAL ONCE
Status: COMPLETED | OUTPATIENT
Start: 2021-07-14 | End: 2021-07-14

## 2021-07-14 RX ORDER — RIZATRIPTAN BENZOATE 10 MG/1
TABLET ORAL
Qty: 8 TABLET | Refills: 3 | Status: SHIPPED | OUTPATIENT
Start: 2021-07-14 | End: 2022-02-25

## 2021-07-14 RX ADMIN — BUPIVACAINE HYDROCHLORIDE 10 ML: 2.5 INJECTION, SOLUTION EPIDURAL; INFILTRATION; INTRACAUDAL at 15:55

## 2021-07-14 NOTE — PROGRESS NOTES
Procedure note for occipital nerve block-The patient's chart was reviewed.  After obtaining verbal consent the patient was placed in a sitting position with her neck flexed and her chin touching his chest.  Both the left and right occipital areas were prepped with antiseptic solution and injected with bupivacaine 0.5% using a 27-gauge needle.  3 cc of bupivacaine was injected at the site of the greater occipital nerve on each side and 2 cc was injected in the lesser occipital nerve on each side.  Patient tolerated the procedure well.    Of note-patient has had about 6 headaches in the past 5 weeks.  She initially took Flexeril and Advil for them.  Also tried Nurtec however that did not help.  She does report to sleeping poorly at night and usually gets only about 5 to 6 hours of disrupted sleep.  Will be seeing Dr. Isaacs today for Botox.  -I will prescribe her Maxalt 10 mg to be taken as needed for abortive treatment of her headaches  -If the headaches do not improve then I may start her on low doses of amitriptyline 10 mg at night

## 2021-08-27 ENCOUNTER — CLINICAL SUPPORT (OUTPATIENT)
Dept: NEUROLOGY | Facility: CLINIC | Age: 66
End: 2021-08-27

## 2021-08-27 VITALS
SYSTOLIC BLOOD PRESSURE: 124 MMHG | HEART RATE: 85 BPM | HEIGHT: 70 IN | OXYGEN SATURATION: 93 % | BODY MASS INDEX: 24.34 KG/M2 | WEIGHT: 170 LBS | DIASTOLIC BLOOD PRESSURE: 86 MMHG

## 2021-08-27 DIAGNOSIS — M54.81 CERVICO-OCCIPITAL NEURALGIA: Primary | ICD-10-CM

## 2021-08-27 DIAGNOSIS — M62.838 MUSCLE SPASMS OF NECK: ICD-10-CM

## 2021-08-27 PROCEDURE — 64405 NJX AA&/STRD GR OCPL NRV: CPT | Performed by: PSYCHIATRY & NEUROLOGY

## 2021-08-27 PROCEDURE — 64450 NJX AA&/STRD OTHER PN/BRANCH: CPT | Performed by: PSYCHIATRY & NEUROLOGY

## 2021-08-27 RX ORDER — CYCLOBENZAPRINE HCL 5 MG
5 TABLET ORAL AS NEEDED
Qty: 30 TABLET | Refills: 5 | Status: SHIPPED | OUTPATIENT
Start: 2021-08-27 | End: 2021-11-01

## 2021-08-27 RX ORDER — AMITRIPTYLINE HYDROCHLORIDE 10 MG/1
10 TABLET, FILM COATED ORAL NIGHTLY
Qty: 30 TABLET | Refills: 5 | Status: SHIPPED | OUTPATIENT
Start: 2021-08-27 | End: 2021-11-01

## 2021-08-27 RX ORDER — BUPIVACAINE HYDROCHLORIDE 2.5 MG/ML
10 INJECTION, SOLUTION INFILTRATION; PERINEURAL ONCE
Status: COMPLETED | OUTPATIENT
Start: 2021-08-27 | End: 2021-08-27

## 2021-08-27 RX ADMIN — BUPIVACAINE HYDROCHLORIDE 10 ML: 2.5 INJECTION, SOLUTION INFILTRATION; PERINEURAL at 11:44

## 2021-08-27 NOTE — PROGRESS NOTES
Procedure note for occipital nerve block-The patient's chart was reviewed.  After obtaining verbal consent the patient was placed in a sitting position with her neck flexed and her chin touching his chest.  Both the left and right occipital areas were prepped with antiseptic solution and injected with bupivacaine 0.5% using a 27-gauge needle.  3 cc of bupivacaine was injected at the site of the greater occipital nerve on each side and 2 cc was injected in the lesser occipital nerve on right side only.  Trigger point injections were given in both trapezius muscles. Patient tolerated the procedure well.    Of note-patient has had about 3 severe headaches in the past 6 weeks.  She has also had a headache almost every day for the past week.  Got her booster Covid shot this past Thursday.  Has been taking Flexeril and Advil as needed.  She continues to have difficulty sleeping at night hence I will start her on amitriptyline 10 mg nightly.  Her next Botox for cervical dystonia is on September 27.  For her neck pain I have encouraged her to continue PT as she has stopped.  
None known

## 2021-08-30 ENCOUNTER — TELEPHONE (OUTPATIENT)
Dept: NEUROLOGY | Facility: CLINIC | Age: 66
End: 2021-08-30

## 2021-08-30 NOTE — TELEPHONE ENCOUNTER
Provider: DR ADAM    Caller: JAZLYN    Relationship to Patient: SELF    Reason for Call: JAZLYN IS CALLING WANTING TO CHECK THE STATUS OF THE PRIOR AUTH ON HER MEDICATION FLEXERIL.   SHE STATES THAT [facility] SENT A FAX/ELECTRONICALLY TO THE OFFICE AND IT WILL BE GOING THROUGH COVER MY MEDS.      JAZLYN ALSO STATES THAT SHE IS LEAVING TOWN THIS WEEKEND AND WILL BE GONE MOST OF September. SHE WILL BE IN THE CAR FOR 13 HOURS.     PLEASE CALL PT TO UPDATE.

## 2021-08-31 NOTE — TELEPHONE ENCOUNTER
Could you submit the PA again under a different diagnosis of muscle spasms.  I have changed it in my note.  Patient was told by her insurance that it should go through under that diagnosis

## 2021-09-15 NOTE — TELEPHONE ENCOUNTER
Could you let the patient know that we submitted the PA under diagnosis of muscle spasms and the Flexeril was still denied.  Does she want to try another muscle relaxant?

## 2021-09-27 RX ORDER — METHYLPREDNISOLONE 4 MG/1
TABLET ORAL
Qty: 1 EACH | Refills: 0 | Status: SHIPPED | OUTPATIENT
Start: 2021-09-27 | End: 2021-11-01

## 2021-11-01 ENCOUNTER — CLINICAL SUPPORT (OUTPATIENT)
Dept: NEUROLOGY | Facility: CLINIC | Age: 66
End: 2021-11-01

## 2021-11-01 VITALS
HEIGHT: 70 IN | OXYGEN SATURATION: 97 % | BODY MASS INDEX: 24.17 KG/M2 | WEIGHT: 168.8 LBS | HEART RATE: 87 BPM | DIASTOLIC BLOOD PRESSURE: 76 MMHG | SYSTOLIC BLOOD PRESSURE: 128 MMHG

## 2021-11-01 DIAGNOSIS — G25.2 DYSTONIC TREMOR: ICD-10-CM

## 2021-11-01 DIAGNOSIS — M62.838 MUSCLE SPASMS OF NECK: ICD-10-CM

## 2021-11-01 DIAGNOSIS — M54.81 CERVICO-OCCIPITAL NEURALGIA: Primary | ICD-10-CM

## 2021-11-01 PROCEDURE — 64450 NJX AA&/STRD OTHER PN/BRANCH: CPT | Performed by: PSYCHIATRY & NEUROLOGY

## 2021-11-01 PROCEDURE — 64405 NJX AA&/STRD GR OCPL NRV: CPT | Performed by: PSYCHIATRY & NEUROLOGY

## 2021-11-01 RX ORDER — FLUTICASONE PROPIONATE 50 MCG
SPRAY, SUSPENSION (ML) NASAL
COMMUNITY
Start: 2021-08-30

## 2021-11-01 RX ORDER — AMITRIPTYLINE HYDROCHLORIDE 25 MG/1
25 TABLET, FILM COATED ORAL NIGHTLY
Qty: 30 TABLET | Refills: 5 | Status: SHIPPED | OUTPATIENT
Start: 2021-11-01 | End: 2022-06-02

## 2021-11-01 RX ORDER — CYCLOBENZAPRINE HCL 5 MG
5 TABLET ORAL NIGHTLY PRN
Qty: 30 TABLET | Refills: 5 | Status: SHIPPED | OUTPATIENT
Start: 2021-11-01 | End: 2021-12-03 | Stop reason: SDUPTHER

## 2021-11-01 RX ORDER — CELECOXIB 200 MG/1
1 CAPSULE ORAL DAILY
COMMUNITY
Start: 2021-10-19 | End: 2022-04-28

## 2021-11-01 NOTE — PROGRESS NOTES
Procedure note for occipital nerve block-The patient's chart was reviewed.  After obtaining verbal consent the patient was placed in a sitting position with her neck flexed and her chin touching his chest.  Both the left and right occipital areas were prepped with antiseptic solution and injected with bupivacaine 0.5% using a 27-gauge needle.  3 cc of bupivacaine was injected at the site of the greater occipital nerve on each side and 2 cc was injected in the lesser occipital nerve on right side only.  Trigger point injections were given in both trapezius muscles. Patient tolerated the procedure well.    Of note-patient has had several headaches in the back of her head and neck in the past 6 weeks which she attributes to long distance traveling by car.  She tried Maxalt which was helpful.  Has still not been able to get Flexeril approved by her insurance but had enough pills from previously to take as needed.  She also started taking amitriptyline 10 mg at night but it has not helped much as she continues to have difficulty sleeping.  I will increase her dose to 25 mg nightly and send in a prescription of Flexeril again

## 2021-11-02 ENCOUNTER — HOSPITAL ENCOUNTER (OUTPATIENT)
Dept: GENERAL RADIOLOGY | Facility: HOSPITAL | Age: 66
Discharge: HOME | End: 2021-11-02
Admitting: NURSE PRACTITIONER

## 2021-11-02 ENCOUNTER — OFFICE VISIT (OUTPATIENT)
Dept: PULMONOLOGY | Facility: CLINIC | Age: 66
End: 2021-11-02

## 2021-11-02 VITALS
TEMPERATURE: 98.3 F | OXYGEN SATURATION: 97 % | HEART RATE: 80 BPM | SYSTOLIC BLOOD PRESSURE: 136 MMHG | DIASTOLIC BLOOD PRESSURE: 88 MMHG | WEIGHT: 168 LBS | BODY MASS INDEX: 24.05 KG/M2 | HEIGHT: 70 IN

## 2021-11-02 DIAGNOSIS — G47.33 OSA (OBSTRUCTIVE SLEEP APNEA): ICD-10-CM

## 2021-11-02 DIAGNOSIS — J30.1 SEASONAL ALLERGIC RHINITIS DUE TO POLLEN: Primary | ICD-10-CM

## 2021-11-02 DIAGNOSIS — Z90.2 S/P PNEUMONECTOMY: ICD-10-CM

## 2021-11-02 DIAGNOSIS — J47.9 BRONCHIECTASIS WITHOUT COMPLICATION (HCC): Primary | ICD-10-CM

## 2021-11-02 DIAGNOSIS — J98.4 RESTRICTIVE LUNG DISEASE: ICD-10-CM

## 2021-11-02 DIAGNOSIS — J47.9 BRONCHIECTASIS WITHOUT COMPLICATION (HCC): ICD-10-CM

## 2021-11-02 PROCEDURE — 71046 X-RAY EXAM CHEST 2 VIEWS: CPT

## 2021-11-02 PROCEDURE — 99214 OFFICE O/P EST MOD 30 MIN: CPT | Performed by: NURSE PRACTITIONER

## 2021-11-03 ENCOUNTER — DOCUMENTATION (OUTPATIENT)
Dept: PULMONOLOGY | Facility: CLINIC | Age: 66
End: 2021-11-03

## 2021-11-03 NOTE — PROGRESS NOTES
Pt called and asked if she could come by and  an oxygen order to take to a place who has a poc. Printed office notes and demographics for patient and sent her with an order.

## 2021-11-03 NOTE — PROGRESS NOTES
[facility] Pulmonary Follow up    CHIEF COMPLAINT    Restrictive lung disease    HISTORY OF PRESENT ILLNESS    Crystal Carrillo is a 66 y.o.female here today for follow-up.  She was last seen in the office by Dr. Long in May.  She denies any respiratory illnesses or exacerbations since her last appointment.    She has a history of a left pneumonectomy when she was 7 years old.  In 2017 she developed acute TB and received standard treatment then developed a Pseudomonas lung infection.    She has a history of SRINIVAS and has been using her BiPAP up until about 2 months ago.  She did get a recall on her machine and stopped using it.  She has noticed slight fatigue and daytime somnolence is not using her BiPAP.  She does have a difficult time wearing her BiPAP occasionally.    She continues to work with pulmonary rehabilitation.  When working moderately she does require up to 6 L with exertion.  She has rented a portable concentrator from her [email or web address] but unfortunately it is too heavy to carry and she would like to have something that is more portable that she can carry alone.    She continues to use her budesonide twice a day.  She also uses her albuterol nebulizers 2-3 times per day.    She denies fever, chills, sputum production, hemoptysis, night sweats, weight loss, chest pain or palpitations.  She denies any lower extremity edema or calf tenderness.  She denies any sinus or allergy symptoms.  She does take Singulair regularly.  She denies reflux symptoms.    She is up-to-date on her current vaccinations.    Patient Active Problem List   Diagnosis   • Restrictive lung disease; chest wall deformity   • S/P pneumonectomy, left infant   • Bronchiectasis without complication (HCC)   • SRINIVAS (obstructive sleep apnea)   • Seasonal allergic rhinitis due to pollen   • Cervico-occipital neuralgia   • Neck pain       No Known Allergies    Current Outpatient Medications:   •  Acetaminophen (TYLENOL EXTRA STRENGTH PO), Take   by mouth As Needed (When really bad HA and she is not near food- takes a combination of tylenol and sudafed)., Disp: , Rfl:   •  albuterol (PROVENTIL) (2.5 MG/3ML) 0.083% nebulizer solution, Take 2.5 mg by nebulization 4 (Four) Times a Day As Needed for Wheezing., Disp: 240 mL, Rfl: 6  •  amitriptyline (ELAVIL) 25 MG tablet, Take 1 tablet by mouth Every Night., Disp: 30 tablet, Rfl: 5  •  budesonide (PULMICORT) 0.5 MG/2ML nebulizer solution, Take 0.5 mg by nebulization Daily., Disp: , Rfl:   •  celecoxib (CeleBREX) 200 MG capsule, Take 1 capsule by mouth Daily., Disp: , Rfl:   •  cholecalciferol (VITAMIN D3) 25 MCG (1000 UT) tablet, Take 1,000 Units by mouth Daily., Disp: , Rfl:   •  cyclobenzaprine (FLEXERIL) 5 MG tablet, Take 1 tablet by mouth At Night As Needed for Muscle Spasms. (Patient has tolerated the medication well before), Disp: 30 tablet, Rfl: 5  •  estradiol (ESTRACE) 0.1 MG/GM vaginal cream, Insert 2 g into the vagina Daily., Disp: , Rfl:   •  fluticasone (FLONASE) 50 MCG/ACT nasal spray, , Disp: , Rfl:   •  ipratropium-albuterol (DUO-NEB) 0.5-2.5 mg/3 ml nebulizer, USE ONE (1) VIAL IN NEBULIZER 4 (FOUR) TIMES A DAY AS NEEDED FOR WHEEZING., Disp: 360 mL, Rfl: 3  •  montelukast (SINGULAIR) 10 MG tablet, Take 10 mg by mouth Every Night. Prn for allergies, Disp: , Rfl:   •  naproxen sodium (ALEVE) 220 MG tablet, Take 220 mg by mouth 2 (Two) Times a Day As Needed for Mild Pain ., Disp: , Rfl:   •  pseudoephedrine (SUDAFED) 30 MG tablet, Take 30 mg by mouth Every 4 (Four) Hours As Needed (When really bad HA and she is not near food- takes a combination of tylenol and sudafed)., Disp: , Rfl:   •  rizatriptan (Maxalt) 10 MG tablet, Take 1 tablet at onset of headache.  May repeat once in 2 hours.  Do not take more than 2 tabs a day or 8 tabs a month, Disp: 8 tablet, Rfl: 3  •  Synthroid 75 MCG tablet, Take 75 mcg by mouth Daily., Disp: , Rfl:   MEDICATION LIST AND ALLERGIES REVIEWED.    Social History  "    Tobacco Use   • Smoking status: Never Smoker   • Smokeless tobacco: Never Used   Vaping Use   • Vaping Use: Never used   Substance Use Topics   • Alcohol use: Never   • Drug use: Never       FAMILY AND SOCIAL HISTORY REVIEWED.    Review of Systems   Constitutional: Positive for fatigue. Negative for activity change, appetite change, fever and unexpected weight change.   HENT: Negative for congestion, postnasal drip, rhinorrhea, sinus pressure, sore throat and voice change.    Eyes: Negative for visual disturbance.   Respiratory: Positive for shortness of breath. Negative for cough, chest tightness and wheezing.         Referred sounds on the left chest   Cardiovascular: Negative for chest pain, palpitations and leg swelling.   Gastrointestinal: Negative for abdominal distention, abdominal pain, nausea and vomiting.   Endocrine: Negative for cold intolerance and heat intolerance.   Genitourinary: Negative for difficulty urinating and urgency.   Musculoskeletal: Negative for arthralgias, back pain and neck pain.   Skin: Negative for color change and pallor.   Allergic/Immunologic: Negative for environmental allergies and food allergies.   Neurological: Negative for dizziness, syncope, weakness and light-headedness.   Hematological: Negative for adenopathy. Does not bruise/bleed easily.   Psychiatric/Behavioral: Negative for agitation and behavioral problems.   .    /88   Pulse 80   Temp 98.3 °F (36.8 °C)   Ht 177.8 cm (70\")   Wt 76.2 kg (168 lb)   LMP  (LMP Unknown)   SpO2 97% Comment: resting, room air  Breastfeeding No   BMI 24.11 kg/m²     Immunization History   Administered Date(s) Administered   • COVID-19 (MODERNA) 02/25/2021, 03/25/2021, 08/19/2021   • FluLaval/Fluarix/Fluzone >6 10/10/2019   • Flublok 18+yrs 11/02/2020   • Fluzone High Dose =>65 Years ([employer] ONLY) 10/07/2021   • Hepatitis A 01/28/2019   • Influenza TIV (IM) 10/10/2019   • Influenza, Unspecified 11/02/2020   • Pneumococcal " Conjugate 13-Valent (PCV13) 04/22/2021   • Pneumococcal Polysaccharide (PPSV23) 10/01/2019       Physical Exam  Vitals and nursing note reviewed.   Constitutional:       Appearance: She is well-developed. She is not diaphoretic.   HENT:      Head: Normocephalic and atraumatic.   Eyes:      Pupils: Pupils are equal, round, and reactive to light.   Neck:      Thyroid: No thyromegaly.   Cardiovascular:      Rate and Rhythm: Normal rate and regular rhythm.      Heart sounds: Normal heart sounds. No murmur heard.  No friction rub. No gallop.    Pulmonary:      Effort: Pulmonary effort is normal. No respiratory distress.      Breath sounds: Normal breath sounds. No wheezing or rales.   Chest:      Chest wall: No tenderness.   Abdominal:      General: Bowel sounds are normal.      Palpations: Abdomen is soft.      Tenderness: There is no abdominal tenderness.   Musculoskeletal:         General: No swelling. Normal range of motion.      Cervical back: Normal range of motion and neck supple.   Lymphadenopathy:      Cervical: No cervical adenopathy.   Skin:     General: Skin is warm and dry.      Capillary Refill: Capillary refill takes less than 2 seconds.   Neurological:      Mental Status: She is alert and oriented to person, place, and time.   Psychiatric:         Behavior: Behavior normal.           RESULTS    6-minute walk test: Patient ambulated 1200 feet and desaturated to 87% was placed on 2 L pulse dose been increased to 4 L and remained above 90%.  She does meet qualifications for portable oxygen with activity.    PROBLEM LIST    Problem List Items Addressed This Visit        Allergies and Adverse Reactions    Seasonal allergic rhinitis due to pollen - Primary       Pulmonary and Pneumonias    Restrictive lung disease; chest wall deformity    S/P pneumonectomy, left infant    Bronchiectasis without complication (HCC)       Sleep    SRINIVAS (obstructive sleep apnea)            DISCUSSION    Ms. Carrillo was here for  follow-up of her restrictive lung disease.  She seems to be doing fairly well from pulmonary standpoint.  She would like a portable concentrator to be able to carry when she goes on trips.  We did a with 6-minute walk test and she does require 4 L pulse dose with activity.  I did advise her that she needs to go slower than she would at her pulmonary rehabilitation as she does require more oxygen while doing pulmonary rehab.  I will order her a portable concentrator that goes up to 4 L pulse dose.    She will continue budesonide twice a day and her albuterol nebulizers as needed.    I did encourage her to restart her BiPAP, she did get a new machine and is going to restart this in the near future.    She will continue Singulair nightly for her seasonal allergies.    She will follow-up in 6 months with Dr. Long or sooner if her symptoms worsen.  She will call with any additional concerns or questions.    Level of service justified based on 36 minutes spent in patient care on this date of service including, but not limited to: preparing to see the patient, obtaining and/or reviewing history, performing medically appropriate examination, ordering tests/medicine/procedures, independently interpreting results, documenting clinical information in EHR, and counseling/education of patient/family/caregiver. (Level 4 30-39 minutes; Level 5 40-54 minutes)      LOTTIE Blake  11/02/202109:03 EDT  Electronically signed     Please note that portions of this note were completed with a voice recognition program. Efforts were made to edit the dictations, but occasionally words are mistranscribed.      CC: Rhina Tomas,

## 2021-11-08 ENCOUNTER — TELEPHONE (OUTPATIENT)
Dept: NEUROLOGY | Facility: CLINIC | Age: 66
End: 2021-11-08

## 2021-11-08 NOTE — TELEPHONE ENCOUNTER
"Provider: JA[facility]  Caller: PATIENT  Relationship to Patient: SELF  Pharmacy: [facility]  Phone Number: [phone]  Reason for Call: PATIENT SAID SHE NEEDS TO SPEAK TO THE PERSON HANDLING FILLING THIS RX FLEXARIL AT [facility]. I BELIEVE SHE MAY BE HAVING AN ISSUE WITH P/A AS SHE MEMTIONED ITS ABOUT THE PAPERWORK AND THE OFFICE HAVING TO JUMP THROUGH HOOPS TO GET HER MEDICATION APPROVED.     PATIENT SAID [facility] SENT A FAX ON [phone] -[phone]- THE PRESCRIBER IS STEFANIA AVILEZ.     AS OF THIS MORNING AT ABOUT 10AM- THEY HAD HEARD NOTHING BACK.     PATIENT WANTS TO SPEAK TO STEFANIA.     SHE SAID THE [facility] ADVISED THAT ON THE PAPERWORK PREV DENIED BECAUSE THE OFFICE CHECKED THE WRONG OPTION-  APPARENTLY THERE ARE 3 OPTIONS E    1)MUSCULAR SKELETAL DISORDERS OR CONDITIONS    2)MUSCLE SPASMS    3)ALL OTHERS.     SHE SAID THE OFFICE CHECKED \"ALL OTHERS\"  \"CERVICAL OPTICAL NEURALGIA.\"    THEY NEED THE \"MUSCLE SPASMS\" CHECKED.             "

## 2021-12-03 RX ORDER — CYCLOBENZAPRINE HCL 5 MG
7.5 TABLET ORAL NIGHTLY PRN
Qty: 45 TABLET | Refills: 5 | Status: SHIPPED | OUTPATIENT
Start: 2021-12-03 | End: 2022-09-08

## 2021-12-08 DIAGNOSIS — M54.81 CERVICO-OCCIPITAL NEURALGIA: Primary | ICD-10-CM

## 2021-12-08 DIAGNOSIS — M54.2 NECK PAIN: ICD-10-CM

## 2022-01-04 ENCOUNTER — TELEPHONE (OUTPATIENT)
Dept: NEUROLOGY | Facility: CLINIC | Age: 67
End: 2022-01-04

## 2022-01-04 NOTE — TELEPHONE ENCOUNTER
LVM for PT to call back. I am needing to know if concern is strictly having pacemaker turned off, being in the MRI tube (claustrophobia), length of time of MRI, or some combination of issues.

## 2022-01-04 NOTE — TELEPHONE ENCOUNTER
NOT ABLE TO BE IN THE MRI FOR ANY LENGTH OF TIME. WOULD LIKE TO TRY SOMETHING ELSE. APPOINTMENT SCHED  JAN 28TH. CONCERNED ABOUT THEM TURNING OFF HER PACEMAKER. DOES NOT WANT TO BE IN THERE FOR 45 MIN.    REQUESTING A CALL.

## 2022-01-07 NOTE — TELEPHONE ENCOUNTER
"Called and spoke to Pt. She is claustrophobic and does not feel comfortable doing an MRI due to anxiety around how bad previous MRIs went and she is also extremely concerned about having to get her pacemaker turned off as she is \"100% Paced and when the pacemaker technician turns it off for 30 seconds to get the readings off it her heart does not beat\". She is very concerned that having it off for 45 minutes would kill her as she is positive her heart would stop. She is open to other imaging options if possible.    I am going to request her pacemaker forms from [facility] in [place], [place] to have on file. We would need to confirm pacemaker compatability moving forward to determine if she is even able to get an MRI done. Pacemaker was placed 8/2008 and battery was switched 5/2015  "

## 2022-01-28 ENCOUNTER — APPOINTMENT (OUTPATIENT)
Dept: MRI IMAGING | Facility: HOSPITAL | Age: 67
End: 2022-01-28

## 2022-02-21 ENCOUNTER — CLINICAL SUPPORT (OUTPATIENT)
Dept: NEUROLOGY | Facility: CLINIC | Age: 67
End: 2022-02-21

## 2022-02-21 VITALS
HEART RATE: 90 BPM | BODY MASS INDEX: 23.48 KG/M2 | SYSTOLIC BLOOD PRESSURE: 122 MMHG | OXYGEN SATURATION: 98 % | DIASTOLIC BLOOD PRESSURE: 76 MMHG | HEIGHT: 70 IN | WEIGHT: 164 LBS

## 2022-02-21 DIAGNOSIS — M54.81 CERVICO-OCCIPITAL NEURALGIA: Primary | ICD-10-CM

## 2022-02-21 PROCEDURE — 64405 NJX AA&/STRD GR OCPL NRV: CPT | Performed by: PSYCHIATRY & NEUROLOGY

## 2022-02-21 PROCEDURE — 64450 NJX AA&/STRD OTHER PN/BRANCH: CPT | Performed by: PSYCHIATRY & NEUROLOGY

## 2022-02-21 NOTE — PROGRESS NOTES
Procedure note for occipital nerve block-The patient's chart was reviewed.  After obtaining verbal consent the patient was placed in a sitting position with her neck flexed and her chin touching his chest.  Both the left and right occipital areas were prepped with antiseptic solution and injected with bupivacaine 0.5% using a 27-gauge needle.  3 cc of bupivacaine was injected at the site of the greater occipital nerve on each side and 2 cc was injected in the lesser occipital nerve on right side and 1 cc on the left side.  Trigger point injection were given the left trapezius muscle. Patient tolerated the procedure well.    Of note-patient states that her headaches have been well controlled for the past 2 months and she has only had about 2-3 severe headaches.  She did increase her amitriptyline to 25 mg nightly and is sleeping better.  Has also started taking Flexeril 7.5 mg nightly.  About 2 weeks ago she also got a steroid shot in her left shoulder which has helped with shoulder and neck pain on that side.

## 2022-02-25 RX ORDER — RIZATRIPTAN BENZOATE 10 MG/1
TABLET ORAL
Qty: 8 TABLET | Refills: 3 | Status: SHIPPED | OUTPATIENT
Start: 2022-02-25 | End: 2022-04-07 | Stop reason: SDUPTHER

## 2022-03-18 ENCOUNTER — TELEPHONE (OUTPATIENT)
Dept: NEUROLOGY | Facility: CLINIC | Age: 67
End: 2022-03-18

## 2022-03-21 RX ORDER — METHYLPREDNISOLONE 4 MG/1
TABLET ORAL
Qty: 1 EACH | Refills: 0 | Status: SHIPPED | OUTPATIENT
Start: 2022-03-21 | End: 2022-04-25 | Stop reason: SDUPTHER

## 2022-03-29 DIAGNOSIS — Z01.812 BLOOD TESTS PRIOR TO TREATMENT OR PROCEDURE: Primary | ICD-10-CM

## 2022-04-06 ENCOUNTER — TELEPHONE (OUTPATIENT)
Dept: NEUROLOGY | Facility: CLINIC | Age: 67
End: 2022-04-06

## 2022-04-06 NOTE — TELEPHONE ENCOUNTER
PT STATED THAT HER INSURANCE SENT VIA FAX FOR rizatriptan (MAXALT) 10 MG tablet. PT IS WONDERING IF [facility] HAS DENIED THIS REFILL OR IF [facility] HASN'T FILLED IT AT THE OFFICE. PT FOUND OUT ITS CHEAPER TO GET IT MAIL ORDER INSTEAD OF LOCAL PHARMACY TO [facility]. INSTEAD OF A 30 DAY SUPPLY WOULD NEED A 90 DAY SUPPLY.       [facility] - [address] - [phone] [facility] [phone]   [phone]    ALL IS NEEDED IS JUST A 90 DAY PILLS SCRIPT INSTEAD OF THE 30.     PLEASE ADVISE       PT CALL BACK     [phone]

## 2022-04-07 RX ORDER — RIZATRIPTAN BENZOATE 10 MG/1
TABLET ORAL
Qty: 24 TABLET | Refills: 0 | Status: SHIPPED | OUTPATIENT
Start: 2022-04-07 | End: 2022-04-28

## 2022-04-22 NOTE — TELEPHONE ENCOUNTER
Caller: [facility] - [address] - [phone] [facility] [phone] FX    Relationship: Pharmacy    Best call back number: [phone]    What was the call regarding: JOSE, PHARM REP, CALLED TO SEE IF MEDROL DOSE PACK WAS SENT AS PT HAD CONTACTED THEM TO SEE IF MEDICATION WAS READY FOR PICK-UP. AFTER LOOKING IN CHART, IT SEEMS MEDICATION AS SENT TO THE INCORRECT PHARMACY. PER BERNA, DR. ADAM HAS LEFT FOR THE DAY SO OFFICE WILL HAVE TO RESEND Monday MORNING.    Do you require a callback: NO, JUST RESEND MEDROL DOSE PACK TO [facility].    PLEASE REVIEW AND ADVISE.

## 2022-04-22 NOTE — TELEPHONE ENCOUNTER
Spoke with the patient and advised that you have called in the Medrol Dosepak to this pharmacy.  Also let her know that she can wear a soft cervical collar to hold her head up.     Pt verbalized understanding has no further questions at this time.

## 2022-04-25 RX ORDER — METHYLPREDNISOLONE 4 MG/1
TABLET ORAL
Qty: 1 EACH | Refills: 0 | Status: SHIPPED | OUTPATIENT
Start: 2022-04-25 | End: 2022-07-07 | Stop reason: SDUPTHER

## 2022-04-28 ENCOUNTER — OFFICE VISIT (OUTPATIENT)
Dept: PULMONOLOGY | Facility: CLINIC | Age: 67
End: 2022-04-28

## 2022-04-28 ENCOUNTER — CLINICAL SUPPORT (OUTPATIENT)
Dept: NEUROLOGY | Facility: CLINIC | Age: 67
End: 2022-04-28

## 2022-04-28 VITALS
WEIGHT: 164 LBS | TEMPERATURE: 98 F | HEART RATE: 89 BPM | HEIGHT: 70 IN | DIASTOLIC BLOOD PRESSURE: 90 MMHG | OXYGEN SATURATION: 98 % | RESPIRATION RATE: 16 BRPM | SYSTOLIC BLOOD PRESSURE: 142 MMHG | BODY MASS INDEX: 23.48 KG/M2

## 2022-04-28 VITALS
DIASTOLIC BLOOD PRESSURE: 80 MMHG | HEIGHT: 70 IN | OXYGEN SATURATION: 95 % | HEART RATE: 88 BPM | WEIGHT: 164 LBS | SYSTOLIC BLOOD PRESSURE: 124 MMHG | BODY MASS INDEX: 23.48 KG/M2

## 2022-04-28 DIAGNOSIS — G47.33 OSA (OBSTRUCTIVE SLEEP APNEA): ICD-10-CM

## 2022-04-28 DIAGNOSIS — M54.81 CERVICO-OCCIPITAL NEURALGIA: Primary | ICD-10-CM

## 2022-04-28 DIAGNOSIS — J98.4 RESTRICTIVE LUNG DISEASE: Primary | ICD-10-CM

## 2022-04-28 DIAGNOSIS — M54.2 NECK PAIN: ICD-10-CM

## 2022-04-28 DIAGNOSIS — J47.9 BRONCHIECTASIS WITHOUT COMPLICATION: ICD-10-CM

## 2022-04-28 DIAGNOSIS — Z90.2 S/P PNEUMONECTOMY: ICD-10-CM

## 2022-04-28 PROCEDURE — 94010 BREATHING CAPACITY TEST: CPT | Performed by: INTERNAL MEDICINE

## 2022-04-28 PROCEDURE — 64405 NJX AA&/STRD GR OCPL NRV: CPT | Performed by: PSYCHIATRY & NEUROLOGY

## 2022-04-28 PROCEDURE — 99213 OFFICE O/P EST LOW 20 MIN: CPT | Performed by: INTERNAL MEDICINE

## 2022-04-28 NOTE — PROGRESS NOTES
"Crystal Carrillo is a 66 y.o. female here for evaluation of   Chief Complaint   Patient presents with   • Follow-up       Problem list:  1. Restrictive ventilatory defect  2. H/o left pneumonectomy, infant  3. Bronchiectasis  4. History of Pseudomonas infection  5. H/o primary TB 2017 treated  6. SRINIVAS non compliant  7. Exercise-induced desaturation.  8. Raynauld's disease  9. Seasonal allergic rhinitis  10. Cervical disk disease  11. Dystonic tremor, Botox injection  12. Cervico-occipital neuralgia, nerve block September 18, 2020  13. Hypothyroid  14. SVT  15. Permanent pacemaker, 2008, battery change, 2015  16. Left pneumonectomy, 1956, June  17. Left thoracoplasty, 1971  18. Childbirth x2  19. Appendectomy  20. Vaginal hysterectomy  21. Umbilical hernia repair  22. No drug allergies       History of Present Illness    66-year-old woman, non-smoker with a previous history of left pneumonectomy when she was 7 years of age for \"cysts\".  She was tested for cystic fibrosis and was negative.  In 2017 she developed active tuberculosis and received standard treatment.  Subsequently she developed a Pseudomonas aeruginosa lung infection.  She has a known history of sleep apnea but not using BiPAP.  Denies fatigue or EDS. She feels rested in the AM. She required supplemental oxygen at 4 to 6 L with exertion.  She uses budesonide nebulized twice daily and albuterol nebulizer as needed.   Currently has a cough with clear mucous in the AM after her nebulizer. She has not required antibiotics in the last year. She is fully vaccinated for COVID.  She really does need oxygen with significant exertion.  If she gets on a treadmill walk she needs 6 L.  She did complete a little pulmonary rehab program in [place].  She has not required antibiotics since I last saw her.      Review of Systems   Respiratory: Positive for cough and shortness of breath.    Musculoskeletal: Positive for back pain and neck pain.         Current Outpatient " Medications:   •  Acetaminophen (TYLENOL EXTRA STRENGTH PO), Take  by mouth As Needed (When really bad HA and she is not near food- takes a combination of tylenol and sudafed)., Disp: , Rfl:   •  albuterol (PROVENTIL) (2.5 MG/3ML) 0.083% nebulizer solution, Take 2.5 mg by nebulization 4 (Four) Times a Day As Needed for Wheezing., Disp: 240 mL, Rfl: 6  •  amitriptyline (ELAVIL) 25 MG tablet, Take 1 tablet by mouth Every Night., Disp: 30 tablet, Rfl: 5  •  budesonide (PULMICORT) 0.5 MG/2ML nebulizer solution, Take 0.5 mg by nebulization Daily., Disp: , Rfl:   •  cholecalciferol (VITAMIN D3) 25 MCG (1000 UT) tablet, Take 1,000 Units by mouth Daily., Disp: , Rfl:   •  cyclobenzaprine (FLEXERIL) 5 MG tablet, Take 1.5 tablets by mouth At Night As Needed for Muscle Spasms. (Patient has tolerated the medication well before), Disp: 45 tablet, Rfl: 5  •  estradiol (ESTRACE) 0.1 MG/GM vaginal cream, Insert 2 g into the vagina Daily., Disp: , Rfl:   •  naproxen sodium (ALEVE) 220 MG tablet, Take 220 mg by mouth 2 (Two) Times a Day As Needed for Mild Pain ., Disp: , Rfl:   •  Synthroid 75 MCG tablet, Take 75 mcg by mouth Daily., Disp: , Rfl:   •  fluticasone (FLONASE) 50 MCG/ACT nasal spray, , Disp: , Rfl:   •  methylPREDNISolone (MEDROL) 4 MG dose pack, Take as directed on package instructions., Disp: 1 each, Rfl: 0  •  montelukast (SINGULAIR) 10 MG tablet, Take 10 mg by mouth Every Night. Prn for allergies, Disp: , Rfl:   •  pseudoephedrine (SUDAFED) 30 MG tablet, Take 30 mg by mouth Every 4 (Four) Hours As Needed (When really bad HA and she is not near food- takes a combination of tylenol and sudafed)., Disp: , Rfl:     Past Medical History:   Diagnosis Date   • Allergic rhinitis    • Anxiety    • Cervical disc disease    • COPD (chronic obstructive pulmonary disease) (HCC)    • Hypothyroid    • PSVT (paroxysmal supraventricular tachycardia) ([facility])    • PVC's (premature ventricular contractions)    • Sleep apnea    • TB  "(pulmonary tuberculosis) 2017     Past Surgical History:   Procedure Laterality Date   • APPENDECTOMY     • CHEST SURGERY  1971    thoracoplasty   • LAPAROSCOPIC ASSISTED VAGINAL HYSTERECTOMY     • LUNG REMOVAL, TOTAL Left     infant for cyst   • PACEMAKER IMPLANTATION  2015    [facility]; [facility] first one   • UMBILICAL HERNIA REPAIR       Social History     Socioeconomic History   • Marital status:    • Number of children: 2   Tobacco Use   • Smoking status: Never Smoker   • Smokeless tobacco: Never Used   Vaping Use   • Vaping Use: Never used   Substance and Sexual Activity   • Alcohol use: Never   • Drug use: Never   • Sexual activity: Defer     Family History   Problem Relation Age of Onset   • Intracerebral hemorrhage Mother    • Glaucoma Father      Blood pressure 142/90, pulse 89, temperature 98 °F (36.7 °C), temperature source Infrared, resp. rate 16, height 177.8 cm (70\"), weight 74.4 kg (164 lb), SpO2 98 %, not currently breastfeeding.    Physical Exam  HENT:      Head: Normocephalic and atraumatic.   Cardiovascular:      Rate and Rhythm: Normal rate and regular rhythm.      Heart sounds: No murmur heard.     Comments: PPM left subclavian  Pulmonary:      Effort: Pulmonary effort is normal.      Comments: absent breath sounds left chest, clear right lung  Abdominal:      Palpations: Abdomen is soft.   Musculoskeletal:      Right lower leg: No edema.      Left lower leg: No edema.   Skin:     General: Skin is warm and dry.   Neurological:      Mental Status: She is alert and oriented to person, place, and time.           PFTs:    4/28/22 FVC 1.07L 27%, 0.72L 24% ratio 67%    November 13, 2020, FVC 1.15 L, 30%, FEV1 0.80 L, 27% ratio 70%, severe restriction     May 8, 2020, FVC 1.32 L, 35%, FEV1 0.87 L, 30% ratio 66%, total lung capacity 2.46 L, 42%, diffusion capacity 8.4, 38%, severe restriction and diffusion impairment     Sleep study in 2012 revealed an apnea-hypopnea index of 35 during REM " sleep, 10 during non-REM sleep    Radiology:   COMPARISON: 05/08/2020.     FINDINGS: PA and lateral views of the chest reveal deformity identified  of the left chest wall with left pneumonectomy. There is hyperexpansion  of the right lung. Mild chronic changes identified within the right  lower lobe. Degenerative changes seen within the spine with some  curvature with convexity to the right. Stable pacer with leads in  satisfactory position.     IMPRESSION:  Stable chest with no new focal parenchymal opacification  present.     D:  11/02/2021  E:  11/02/2021      Lab:    October 7, 2021, white count 4.6, hemoglobin 15.5, hematocrit 47.1, platelet count 254, 62% polys, 25% lymphs, 3.5% eosinophils, sodium 140, potassium 4.9, chloride 100, bicarbonate 32, glucose 90, creatinine 0.7, BUN 21, calcium 9.7, total protein 7.9, albumin 4.9, bili 0.5, alk phos 59, AST 27, ALT 14, TSH 1.83    Diagnoses and all orders for this visit:    1. Restrictive lung disease; chest wall deformity (Primary)  -     Pulmonary Function Test    2. Bronchiectasis without complication (HCC)    3. SRINIVAS (obstructive sleep apnea)    4. S/P pneumonectomy, left infant        Discussion:     66-year-old woman with a history of left pneumonectomy as an infant, active tuberculosis treated in 2017 with some residual bronchiectasis, exercise-induced desaturation here for follow-up.  She currently has no active wheezing or congestion on examination.  Her resting saturation is adequate at 96%.  Previous 6-minute walk test however have shown precipitous desaturation.  PFTs of course revealed severe restriction.  Striction's both from absence of her left lung and kyphoscoliosis and restriction from her chest wall.  She had a sleep study in 2012 with an apnea-hypopnea index of 35.  Unfortunately she has stopped wearing her BiPAP.  She is not having excessive daytime somnolence.  We discussed repeating a home sleep study but she declined.    -continue  Budesonide 2x day  -continue Albuterol daily am and pm, then up to 4x daily as needed.   -watch off BiPAP  -Supplemental oxygen 6 L with exercise, consider wearing 2 L at night  -6 months cxr  - trouble with pft, so will not repeat    Susan Long MD

## 2022-04-29 NOTE — PROGRESS NOTES
Procedure note for occipital nerve block-The patient's chart was reviewed.  After obtaining verbal consent the patient was placed in a sitting position with her neck flexed and her chin touching his chest.  Both the left and right occipital areas were prepped with antiseptic solution and injected with bupivacaine 0.5% using a 27-gauge needle.  3 cc of bupivacaine was injected at the site of the greater occipital nerve on each side and 2 cc was injected in the lesser occipital nerve on right side and 1 cc on the left side.  Trigger point injection were given the left trapezius muscle. Patient tolerated the procedure well.    Of note-patient states that her neck has been feeling weaker after her last Botox cycle that she received for dystonia.  She has difficulty holding her head up at times and has to use her hands.  I have told her to speak to Dr Isaacs about possibly giving her a less amount of Botox at her next visit for her dystonia.  I will also give her a PT referral today.  For her headaches she continues to take amitriptyline 25 mg nightly and Flexeril 5 mg nightly but still gets slightly sleepy during the daytime therefore she will try reducing her dose of Flexeril.  For abortive treatment of her headaches she continues to take Maxalt which helps but then the headache comes back the next day.  I have given her samples of Ubrelvy and Nurtec to try.  She will let me know whichever one she finds more effective.

## 2022-05-24 DIAGNOSIS — J47.9 BRONCHIECTASIS WITHOUT ACUTE EXACERBATION: ICD-10-CM

## 2022-05-24 DIAGNOSIS — J98.4 RESTRICTIVE LUNG DISEASE: ICD-10-CM

## 2022-05-24 RX ORDER — ALBUTEROL SULFATE 2.5 MG/3ML
SOLUTION RESPIRATORY (INHALATION)
Qty: 360 ML | Refills: 6 | Status: SHIPPED | OUTPATIENT
Start: 2022-05-24

## 2022-06-02 RX ORDER — AMITRIPTYLINE HYDROCHLORIDE 25 MG/1
TABLET, FILM COATED ORAL
Qty: 30 TABLET | Refills: 11 | Status: SHIPPED | OUTPATIENT
Start: 2022-06-02

## 2022-06-02 NOTE — TELEPHONE ENCOUNTER
Rx Refill Note  Requested Prescriptions     Pending Prescriptions Disp Refills   • amitriptyline (ELAVIL) 25 MG tablet [Pharmacy Med Name: AMITRIPTYLINE HCL TABS 25MG] 30 tablet 11     Sig: TAKE 1 TABLET EVERY NIGHT      Last office visit with prescribing clinician: 5/27/2021      Next office visit with prescribing clinician: 6/29/2022            Genet Dior MA  06/02/22, 16:34 EDT

## 2022-06-29 ENCOUNTER — OFFICE VISIT (OUTPATIENT)
Dept: NEUROLOGY | Facility: CLINIC | Age: 67
End: 2022-06-29

## 2022-06-29 VITALS
HEART RATE: 67 BPM | DIASTOLIC BLOOD PRESSURE: 80 MMHG | HEIGHT: 70 IN | RESPIRATION RATE: 16 BRPM | BODY MASS INDEX: 22.54 KG/M2 | SYSTOLIC BLOOD PRESSURE: 130 MMHG | WEIGHT: 157.4 LBS

## 2022-06-29 DIAGNOSIS — G25.2 DYSTONIC TREMOR: ICD-10-CM

## 2022-06-29 DIAGNOSIS — M54.2 NECK PAIN: ICD-10-CM

## 2022-06-29 DIAGNOSIS — M54.81 CERVICO-OCCIPITAL NEURALGIA: Primary | ICD-10-CM

## 2022-06-29 PROCEDURE — 64405 NJX AA&/STRD GR OCPL NRV: CPT | Performed by: PSYCHIATRY & NEUROLOGY

## 2022-06-29 PROCEDURE — 64450 NJX AA&/STRD OTHER PN/BRANCH: CPT | Performed by: PSYCHIATRY & NEUROLOGY

## 2022-06-29 NOTE — PROGRESS NOTES
Procedure note for occipital nerve block-The patient's chart was reviewed.  After obtaining verbal consent the patient was placed in a sitting position with her neck flexed and her chin touching her chest.  Both the left and right occipital areas were prepped with antiseptic solution and injected with bupivacaine 0.5% using a 27-gauge needle.  3 cc of bupivacaine was injected at the site of the greater occipital nerve on each side and 2 cc was injected in the lesser occipital nerve on right side and 1 cc on the left side.  Trigger point injection were given the right trapezius muscle. Patient tolerated the procedure well.    Of note- for her dystonic tremor patient skipped her last cycle of Botox.  She was told by Dr. Isaacs to call her whenever she started to have her tremor.  She had about 4 headaches in April and May but about 10 headaches in June for which she had to take a steroid taper pack in addition to her Maxalt.  She also continues to take amitriptyline 25 mg nightly but has stopped taking Flexeril every night and only takes it as needed.  She has been getting PT for her neck pain which helps tremendously.  Wants to continue getting it therefore I will also give her a PT referral today.  For abortive treatment of her headaches she tried Ubrelvy which she found effective but about the same as Maxalt therefore she can continue taking Maxalt.

## 2022-07-06 RX ORDER — RIZATRIPTAN BENZOATE 10 MG/1
TABLET ORAL
Qty: 24 TABLET | Refills: 11 | Status: SHIPPED | OUTPATIENT
Start: 2022-07-06 | End: 2022-09-27 | Stop reason: SDUPTHER

## 2022-07-06 NOTE — TELEPHONE ENCOUNTER
Rx Refill Note  Requested Prescriptions     Pending Prescriptions Disp Refills   • rizatriptan (MAXALT) 10 MG tablet [Pharmacy Med Name: RIZATRIPTAN BENZOATE TABS 1'S 10MG] 24 tablet 11     Sig: TAKE 1 TABLET MAY REPEAT IN 2 HOURS IF NEEDED      Last office visit with prescribing clinician: 6/29/2022      Next office visit with prescribing clinician: 8/23/2022            Genet Dior MA  07/06/22, 12:30 EDT

## 2022-07-07 NOTE — TELEPHONE ENCOUNTER
Caller: JAZLYN    Relationship: SELF    Best call back number: [phone]    Requested Prescriptions:   Requested Prescriptions     Pending Prescriptions Disp Refills   • methylPREDNISolone (MEDROL) 4 MG dose pack 1 each 0     Sig: Take as directed on package instructions.        Pharmacy where request should be sent: [address] - [phone]  - [phone] FX     Additional details provided by patient: PT STATES SHE IS ONE DAY 4 OF A HEADACHE     Does the patient have less than a 3 day supply:  [] Yes  [] No    Sam Doyle Rep   07/07/22 14:53 EDT

## 2022-07-08 RX ORDER — METHYLPREDNISOLONE 4 MG/1
TABLET ORAL
Qty: 1 EACH | Refills: 0 | Status: SHIPPED | OUTPATIENT
Start: 2022-07-08 | End: 2023-01-30

## 2022-07-08 NOTE — TELEPHONE ENCOUNTER
Rx Refill Note  Requested Prescriptions     Pending Prescriptions Disp Refills   • methylPREDNISolone (MEDROL) 4 MG dose pack 1 each 0     Sig: Take as directed on package instructions.      Last office visit with prescribing clinician: 6/29/2022      Next office visit with prescribing clinician: 8/23/2022            Genet Dior MA  07/08/22, 08:23 EDT

## 2022-09-08 RX ORDER — CYCLOBENZAPRINE HCL 5 MG
TABLET ORAL
Qty: 30 TABLET | Refills: 11 | Status: SHIPPED | OUTPATIENT
Start: 2022-09-08 | End: 2023-03-14

## 2022-09-27 ENCOUNTER — OFFICE VISIT (OUTPATIENT)
Dept: NEUROLOGY | Facility: CLINIC | Age: 67
End: 2022-09-27

## 2022-09-27 VITALS
DIASTOLIC BLOOD PRESSURE: 64 MMHG | WEIGHT: 152 LBS | HEART RATE: 84 BPM | BODY MASS INDEX: 21.81 KG/M2 | OXYGEN SATURATION: 96 % | SYSTOLIC BLOOD PRESSURE: 128 MMHG

## 2022-09-27 DIAGNOSIS — M54.81 CERVICO-OCCIPITAL NEURALGIA: Primary | ICD-10-CM

## 2022-09-27 PROCEDURE — 64405 NJX AA&/STRD GR OCPL NRV: CPT | Performed by: PSYCHIATRY & NEUROLOGY

## 2022-09-27 PROCEDURE — 64450 NJX AA&/STRD OTHER PN/BRANCH: CPT | Performed by: PSYCHIATRY & NEUROLOGY

## 2022-09-27 RX ORDER — RIZATRIPTAN BENZOATE 10 MG/1
10 TABLET ORAL AS NEEDED
Qty: 8 TABLET | Refills: 5
Start: 2022-09-27

## 2022-09-27 RX ORDER — DICLOFENAC SODIUM 75 MG/1
TABLET, DELAYED RELEASE ORAL
COMMUNITY
Start: 2022-08-15

## 2022-09-27 NOTE — PROGRESS NOTES
Procedure note for occipital nerve block-The patient's chart was reviewed.  After obtaining verbal consent the patient was placed in a sitting position with her neck flexed and her chin touching her chest.  Both the left and right occipital areas were prepped with antiseptic solution and injected with lidocaine 2% along with Solu-Medrol 125 mg/vial using a 27-gauge needle.  2 cc of solution was injected at the site of the greater occipital nerve on each side and 1 cc was injected in the lesser occipital nerve on each side.    Patient tolerated the procedure well.    Of note- patient had her last Botox for her dystonic tremor in August.  Her headaches have improved since she got a steroid shot in her right shoulder last month.  She continues to take amitriptyline 25 mg nightly and Maxalt 10 mg as needed

## 2022-12-05 ENCOUNTER — OFFICE VISIT (OUTPATIENT)
Dept: NEUROLOGY | Facility: CLINIC | Age: 67
End: 2022-12-05

## 2022-12-05 VITALS
DIASTOLIC BLOOD PRESSURE: 78 MMHG | BODY MASS INDEX: 21.81 KG/M2 | HEART RATE: 82 BPM | WEIGHT: 152 LBS | SYSTOLIC BLOOD PRESSURE: 152 MMHG | OXYGEN SATURATION: 98 %

## 2022-12-05 DIAGNOSIS — M54.81 CERVICO-OCCIPITAL NEURALGIA: Primary | ICD-10-CM

## 2022-12-05 PROCEDURE — 64450 NJX AA&/STRD OTHER PN/BRANCH: CPT | Performed by: PSYCHIATRY & NEUROLOGY

## 2022-12-05 PROCEDURE — 64405 NJX AA&/STRD GR OCPL NRV: CPT | Performed by: PSYCHIATRY & NEUROLOGY

## 2022-12-05 NOTE — PROGRESS NOTES
Procedure note for occipital nerve block-The patient's chart was reviewed.  After obtaining verbal consent the patient was placed in a sitting position with her neck flexed and her chin touching her chest.  Both the left and right occipital areas were prepped with antiseptic solution and injected with lidocaine 2% along with Solu-Medrol 125 mg/vial using a 27-gauge needle.  3 cc of solution was injected at the site of the greater occipital nerve on each side and 2 cc was injected in the lesser occipital nerve on each side.    Patient tolerated the procedure well other than mild dizziness and tingling of her lips which subsided in a few minutes after lying down.    Of note- patient's headaches have improved and she has been having about 5 headaches each month for which she takes Maxalt.  Also continues to take amitriptyline 25 mg nightly.

## 2022-12-06 ENCOUNTER — OFFICE VISIT (OUTPATIENT)
Dept: PULMONOLOGY | Facility: CLINIC | Age: 67
End: 2022-12-06

## 2022-12-06 VITALS
OXYGEN SATURATION: 95 % | TEMPERATURE: 97.1 F | BODY MASS INDEX: 21.92 KG/M2 | WEIGHT: 152.8 LBS | DIASTOLIC BLOOD PRESSURE: 84 MMHG | SYSTOLIC BLOOD PRESSURE: 146 MMHG | HEART RATE: 62 BPM

## 2022-12-06 DIAGNOSIS — J30.1 SEASONAL ALLERGIC RHINITIS DUE TO POLLEN: ICD-10-CM

## 2022-12-06 DIAGNOSIS — J47.9 BRONCHIECTASIS WITHOUT COMPLICATION: Primary | ICD-10-CM

## 2022-12-06 DIAGNOSIS — J98.4 RESTRICTIVE LUNG DISEASE: ICD-10-CM

## 2022-12-06 DIAGNOSIS — Z90.2 S/P PNEUMONECTOMY: ICD-10-CM

## 2022-12-06 PROCEDURE — 99213 OFFICE O/P EST LOW 20 MIN: CPT | Performed by: INTERNAL MEDICINE

## 2022-12-06 RX ORDER — ADHESIVE TAPE 3"X 2.3 YD
TAPE, NON-MEDICATED TOPICAL
COMMUNITY

## 2022-12-06 RX ORDER — ESTRADIOL 0.1 MG/G
1 CREAM VAGINAL
COMMUNITY
Start: 2022-10-17

## 2022-12-06 RX ORDER — MULTIPLE VITAMINS W/ MINERALS TAB 9MG-400MCG
1 TAB ORAL DAILY
COMMUNITY

## 2022-12-06 RX ORDER — CHOLECALCIFEROL (VITAMIN D3) 125 MCG
5 CAPSULE ORAL
COMMUNITY

## 2022-12-06 NOTE — PROGRESS NOTES
"Crystal Carrillo is a 67 y.o. female here for evaluation of   Chief Complaint   Patient presents with   • Restrictive lung disease; chest wall deformity     FOLLOW UP       Problem list:  1. Restrictive ventilatory defect  2. H/o left pneumonectomy, infant  3. Bronchiectasis  4. History of Pseudomonas infection  5. H/o primary TB 2017 treated  6. SRINIVAS non compliant  7. Exercise-induced desaturation.  8. Raynauld's disease  9. Seasonal allergic rhinitis  10. Cervical disk disease  11. Dystonic tremor, Botox injection  12. Cervico-occipital neuralgia, nerve block September 18, 2020  13. Chronic occipital headaches  14. Hypothyroid  15. SVT  16. Permanent pacemaker, 2008, battery change, 2015  17. Left pneumonectomy, 1956, June  18. Left thoracoplasty, 1971  19. Childbirth x2  20. Appendectomy  21. Vaginal hysterectomy  22. Umbilical hernia repair  23. No drug allergies       History of Present Illness    66-year-old woman, non-smoker with a previous history of left pneumonectomy when she was 7 years of age for \"cysts\".  She was tested for cystic fibrosis and was negative.  In 2017 she developed active tuberculosis and received standard treatment.  Subsequently she developed a Pseudomonas aeruginosa lung infection.  She has a known history of sleep apnea but not using BiPAP. She did lose weight.  Denies fatigue or EDS. She feels rested in the AM.  She required supplemental oxygen at 4 to 6 L with exertion.  She uses budesonide nebulized twice daily and albuterol nebulizer as needed.   She currently has clear sputum in the AM. She has not required antibiotics.  She is symptomatically short of breath with exertion but it is helped by her supplemental oxygen.  She has received her latest COVID-vaccine and a high-dose flu vaccine.      Review of Systems   Constitutional: Negative for activity change and fatigue.   Respiratory: Positive for cough and shortness of breath.    Musculoskeletal: Positive for arthralgias, back pain " and neck pain.   Neurological: Positive for headaches.         Current Outpatient Medications:   •  Acetaminophen (TYLENOL EXTRA STRENGTH PO), Take  by mouth As Needed (When really bad HA and she is not near food- takes a combination of tylenol and sudafed)., Disp: , Rfl:   •  albuterol (PROVENTIL) (2.5 MG/3ML) 0.083% nebulizer solution, USE (1) VIAL IN NEBULIZER (4) TIMES A DAY AS NEEDED FOR WHEEZING, Disp: 360 mL, Rfl: 6  •  amitriptyline (ELAVIL) 25 MG tablet, TAKE 1 TABLET EVERY NIGHT, Disp: 30 tablet, Rfl: 11  •  budesonide (PULMICORT) 0.5 MG/2ML nebulizer solution, Take 0.5 mg by nebulization Daily., Disp: , Rfl:   •  cyclobenzaprine (FLEXERIL) 5 MG tablet, TAKE 1 TABLET AT NIGHT AS NEEDED FOR MUSCLE SPASMS, Disp: 30 tablet, Rfl: 11  •  estradiol (ESTRACE) 0.1 MG/GM vaginal cream, Insert 1 g into the vagina., Disp: , Rfl:   •  fluticasone (FLONASE) 50 MCG/ACT nasal spray, , Disp: , Rfl:   •  Magnesium Oxide (Mag-200) 200 MG tablet, Take  by mouth., Disp: , Rfl:   •  melatonin 5 MG tablet tablet, Take 5 mg by mouth., Disp: , Rfl:   •  methylPREDNISolone (MEDROL) 4 MG dose pack, Take as directed on package instructions., Disp: 1 each, Rfl: 0  •  montelukast (SINGULAIR) 10 MG tablet, Take 10 mg by mouth Every Night. Prn for allergies, Disp: , Rfl:   •  multivitamin with minerals tablet tablet, Take 1 tablet by mouth Daily., Disp: , Rfl:   •  potassium bicarbonate (K-LYTE) 25 MEQ disintegrating tablet, Take 25 mEq by mouth 2 (Two) Times a Day., Disp: , Rfl:   •  pseudoephedrine (SUDAFED) 30 MG tablet, Take 30 mg by mouth Every 4 (Four) Hours As Needed (When really bad HA and she is not near food- takes a combination of tylenol and sudafed)., Disp: , Rfl:   •  rizatriptan (MAXALT) 10 MG tablet, Take 1 tablet by mouth As Needed for Migraine (At the onset of her headache.  May repeat in 2 hours if needed.  Do not take over 2 tabs a day or 8 tabs a month)., Disp: 8 tablet, Rfl: 5  •  Synthroid 75 MCG tablet, Take 75  mcg by mouth Daily., Disp: , Rfl:   •  cholecalciferol (VITAMIN D3) 25 MCG (1000 UT) tablet, Take 1,000 Units by mouth Daily., Disp: , Rfl:   •  diclofenac (VOLTAREN) 75 MG EC tablet, , Disp: , Rfl:     Past Medical History:   Diagnosis Date   • Allergic rhinitis    • Anxiety    • Arthritis    • Back complaints    • Cervical disc disease    • COPD (chronic obstructive pulmonary disease) ([facility])    • Hypothyroid    • PSVT (paroxysmal supraventricular tachycardia) (Former[facility])    • PVC's (premature ventricular contractions)    • Shoulder pain    • Sleep apnea    • TB (pulmonary tuberculosis) 2017     Past Surgical History:   Procedure Laterality Date   • APPENDECTOMY     • CHEST SURGERY  1971    thoracoplasty   • LAPAROSCOPIC ASSISTED VAGINAL HYSTERECTOMY     • LUNG REMOVAL, TOTAL Left     infant for cyst   • PACEMAKER IMPLANTATION  2015    [facility]; 2008 first one   • UMBILICAL HERNIA REPAIR       Social History     Socioeconomic History   • Marital status:    • Number of children: 2   Tobacco Use   • Smoking status: Never   • Smokeless tobacco: Never   Vaping Use   • Vaping Use: Never used   Substance and Sexual Activity   • Alcohol use: Never   • Drug use: Never   • Sexual activity: Defer     Family History   Problem Relation Age of Onset   • Intracerebral hemorrhage Mother    • Glaucoma Father      Blood pressure 146/84, pulse 62, temperature 97.1 °F (36.2 °C), temperature source Infrared, weight 69.3 kg (152 lb 12.8 oz), SpO2 95 %, not currently breastfeeding.    Physical Exam  HENT:      Head: Normocephalic and atraumatic.   Eyes:      Conjunctiva/sclera: Conjunctivae normal.   Cardiovascular:      Rate and Rhythm: Normal rate and regular rhythm.      Heart sounds: No murmur heard.  Pulmonary:      Comments: Scoliosis. Right lung clear  Musculoskeletal:      Comments: Fingers are cool to touch and red   Lymphadenopathy:      Cervical: No cervical adenopathy.   Neurological:      Mental Status: She is  alert.           PFTs:    4/28/22 FVC 1.07L 27%, 0.72L 24% ratio 67%    November 13, 2020, FVC 1.15 L, 30%, FEV1 0.80 L, 27% ratio 70%, severe restriction     May 8, 2020, FVC 1.32 L, 35%, FEV1 0.87 L, 30% ratio 66%, total lung capacity 2.46 L, 42%, diffusion capacity 8.4, 38%, severe restriction and diffusion impairment     Sleep study in 2012 revealed an apnea-hypopnea index of 35 during REM sleep, 10 during non-REM sleep    Radiology:    cxr 12/6/22 POST LEFT PNEUMONECTOMY, LT SCV PPM, HEART SHIFTED INTO LEFT THORAX, RIGHT LUNG INFLATED MILD LINEAR SCAR      COMPARISON: 05/08/2020.     FINDINGS: PA and lateral views of the chest reveal deformity identified  of the left chest wall with left pneumonectomy. There is hyperexpansion  of the right lung. Mild chronic changes identified within the right  lower lobe. Degenerative changes seen within the spine with some  curvature with convexity to the right. Stable pacer with leads in  satisfactory position.     IMPRESSION:  Stable chest with no new focal parenchymal opacification  present.     D:  11/02/2021  E:  11/02/2021      Lab:    October 17, 2022, sodium 141, potassium 4.3, chloride 101, bicarbonate 31, BUN 18, creatinine 0.6, glucose 79, calcium 9.4, total protein 7.6, albumin 4.8, total bilirubin 0.5, alk phos 54, AST 30, ALT 26, free T4 1.26, TSH 1.38    April 2022, white count 4.6, hemoglobin 14.5, platelet count 224, 65 polys, 25 lymphs, 7 monos, 2 eos    October 7, 2021, white count 4.6, hemoglobin 15.5, hematocrit 47.1, platelet count 254, 62% polys, 25% lymphs, 3.5% eosinophils, sodium 140, potassium 4.9, chloride 100, bicarbonate 32, glucose 90, creatinine 0.7, BUN 21, calcium 9.7, total protein 7.9, albumin 4.9, bili 0.5, alk phos 59, AST 27, ALT 14, TSH 1.83    Diagnoses and all orders for this visit:    1. Bronchiectasis without complication (HCC) (Primary)  -     XR Chest PA & Lateral    2. Restrictive lung disease; chest wall deformity    3. Seasonal  allergic rhinitis due to pollen    4. S/P pneumonectomy, left infant        Discussion:     66-year-old woman with a history of left pneumonectomy as an infant, active tuberculosis treated in 2017 with some residual bronchiectasis, exercise-induced desaturation here for follow-up.  She has stable disease. She continues to need oxygen with exertion. I do suspect she mildly retains CO2 because serum bicarb is 31.  However she has no difficulty with alertness or fatigue and her exercise tolerance has not declined.    -continue Budesonide 2x day  -continue Albuterol daily am and pm, then up to 4x daily as needed.   -watch off BiPAP, does not wish to use it.  -needs new nebulizer machine  -Supplemental oxygen 6 L with exercise, consider wearing 2 L at night  -working out at center with oxygen  - IS 10 reps BID  -6 months with spirometry    Susan Long MD

## 2022-12-12 ENCOUNTER — TELEPHONE (OUTPATIENT)
Dept: PULMONOLOGY | Facility: CLINIC | Age: 67
End: 2022-12-12

## 2022-12-12 NOTE — TELEPHONE ENCOUNTER
Pt LVM notifying DR that pt tested positive for covid today, PCP is taking care of pt and starting her on paxlovid today. Just wanted to let her know, pt said she isnt feeling miserable.

## 2023-01-20 ENCOUNTER — TELEPHONE (OUTPATIENT)
Dept: NEUROLOGY | Facility: CLINIC | Age: 68
End: 2023-01-20
Payer: MEDICARE

## 2023-01-20 NOTE — TELEPHONE ENCOUNTER
Provider: JAIR  Caller:JAZLYN  Relationship to Patient: SELF  Pharmacy: N/A  Phone Number: [phone]  Reason for Call:PT CALLED AND WAS WANTING TO KNOW IF IT WAS OKAY TO STILL TAKE STILL CONTINUE THE MAXALT SINCE PT PCP PUT HER ON BLOOD PRESSURE MEDICATION? PT IS CONCERNED WITH HER BP BEING 170-100 OR AMBER AT TIME.  PT WANTS TO KNOW IF IT IS OKAY A MAXALT THIS MORNING FOR A HEADACHE.    PLEASE REVIEW AND ADVISE.  THANK YOU.

## 2023-01-30 ENCOUNTER — TELEPHONE (OUTPATIENT)
Dept: NEUROLOGY | Facility: CLINIC | Age: 68
End: 2023-01-30

## 2023-01-30 RX ORDER — METHYLPREDNISOLONE 4 MG/1
TABLET ORAL
Qty: 1 EACH | Refills: 0 | Status: SHIPPED | OUTPATIENT
Start: 2023-01-30

## 2023-01-30 NOTE — TELEPHONE ENCOUNTER
Caller: Crystal Carrillo     Relationship: PATIENT    Best call back number: [phone]    What is your medical concern? PATIENT WOKE UP THIS MORNING WITH HER 10TH HEADACHE THIS MONTH. PATIENT HAS REACHED HER LIMIT OF MAXALT.    CAN DR. ADAM SEND AN RX TO [facility] FOR A STEROID LAMAR? THIS SEEMS TO HELP GET HER HEADACHE PATTERN UNDER CONTROL.    PLEASE CALL AND ADVISE        How long has this issue been going on? N/A    Is your provider already aware of this issue? N/A    Have you been treated for this issue? N/A

## 2023-02-23 ENCOUNTER — TELEPHONE (OUTPATIENT)
Dept: NEUROLOGY | Facility: CLINIC | Age: 68
End: 2023-02-23

## 2023-02-23 NOTE — TELEPHONE ENCOUNTER
Provider: EDDIE ADAM MD    Caller:  JAZLYN    Relationship to Patient: SELF    Phone Number: [phone]    Reason for Call: PT CALLING TO SEE IF SHE CAN HAVE HER 3-14-23 APPT CHANGED TO  3-8-23.  STATE SHE HAS AN APPT DR. SAEZ FOR [facility] ON 3-8-23.  CALLED S/W LOU AT CLINIC AND ADVISED TO SEND A MESSAGE D/T NOT SURE IF WE CAN DO THESE ON THE SAME DAY.  ADVISED IF IT IS OK SHE WILL SCHEDULE AND CALL PT.  ADVISED PT I AM SENDING A MESSAGE.  STATED SHE ALWAYS DOES THEM ON THE SAME DAY D/T TOTAL OF A 4 HOUR DRIVE FOR THE APPT.  PT V/U    STATED HER 3-8-23 APPT IS AT 1 PM.  STATED IT LAST FOR AN HOUR.      STATED IT IS OK TO LEAVE VOICE MAIL WHEN CALLING PT BACK      PLEASE CALL & ADVISE

## 2023-02-24 ENCOUNTER — TELEPHONE (OUTPATIENT)
Dept: NEUROLOGY | Facility: CLINIC | Age: 68
End: 2023-02-24
Payer: MEDICARE

## 2023-02-24 NOTE — TELEPHONE ENCOUNTER
Spoke with patient she was requesting injection be rescheduled as she cant drive herself to [place] and it is a 2 hours drive. She has another injection scheduled on March 8th and was wanting those injections to be on the same day. I told patient this was not possible and she stated she will keep her appointment with Dr. Simons as this is more important to her.

## 2023-03-14 ENCOUNTER — CLINICAL SUPPORT (OUTPATIENT)
Dept: NEUROLOGY | Facility: CLINIC | Age: 68
End: 2023-03-14
Payer: MEDICARE

## 2023-03-14 VITALS
BODY MASS INDEX: 22.19 KG/M2 | HEIGHT: 70 IN | SYSTOLIC BLOOD PRESSURE: 144 MMHG | WEIGHT: 155 LBS | DIASTOLIC BLOOD PRESSURE: 84 MMHG

## 2023-03-14 DIAGNOSIS — M54.81 CERVICO-OCCIPITAL NEURALGIA: Primary | ICD-10-CM

## 2023-03-14 DIAGNOSIS — M62.838 MUSCLE SPASMS OF NECK: ICD-10-CM

## 2023-03-14 PROCEDURE — 64405 NJX AA&/STRD GR OCPL NRV: CPT | Performed by: PSYCHIATRY & NEUROLOGY

## 2023-03-14 PROCEDURE — 64450 NJX AA&/STRD OTHER PN/BRANCH: CPT | Performed by: PSYCHIATRY & NEUROLOGY

## 2023-03-14 RX ORDER — CYCLOBENZAPRINE HCL 5 MG
5 TABLET ORAL NIGHTLY PRN
Qty: 60 TABLET | Refills: 1 | Status: SHIPPED | OUTPATIENT
Start: 2023-03-14

## 2023-03-14 RX ORDER — AMLODIPINE BESYLATE 5 MG/1
TABLET ORAL
COMMUNITY
Start: 2023-01-19

## 2023-03-14 NOTE — PROGRESS NOTES
Procedure note for occipital nerve block-The patient's chart was reviewed.  After obtaining verbal consent the patient was placed in a sitting position with her neck flexed and her chin touching her chest.  Both the left and right occipital areas were prepped with antiseptic solution and injected with lidocaine 2% along with Solu-Medrol 125 mg/vial using a 27-gauge needle.  3 cc of solution was injected at the site of the greater occipital nerve on each side and 2 cc was injected in the lesser occipital nerve on each side.    Patient tolerated the procedure well other than mild dizziness and tingling of her lips which subsided in a few minutes     Of note- patient states she had an increased headache frequency in January due to a new blood pressure medication that she was started on (Norvasc) but after she cut back on her dose her headaches have improved.  She also got a Kenalog shot in her right shoulder which has helped.  She continues to take amitriptyline 25 mg nightly and Maxalt as needed.  Also takes Flexeril as needed for neck pain but has run out of it recently as her insurance denied it.  I will send in a new prescription

## 2023-05-05 ENCOUNTER — PRIOR AUTHORIZATION (OUTPATIENT)
Dept: NEUROLOGY | Facility: CLINIC | Age: 68
End: 2023-05-05
Payer: MEDICARE

## 2023-05-08 RX ORDER — METHYLPREDNISOLONE 4 MG/1
TABLET ORAL
Qty: 1 EACH | Refills: 0 | Status: SHIPPED | OUTPATIENT
Start: 2023-05-08

## 2023-05-08 NOTE — TELEPHONE ENCOUNTER
Caller: DR ADAM     Relationship: PATIENT     Best call back number: [phone]    Requested Prescriptions:   Requested Prescriptions     Pending Prescriptions Disp Refills   • methylPREDNISolone (MEDROL) 4 MG dose pack 1 each 0     Sig: Take as directed on package instructions.        Pharmacy where request should be sent: [address] - [phone]  - [phone] FX     Last office visit with prescribing clinician: 12/5/2022   Last telemedicine visit with prescribing clinician: 6/28/2023   Next office visit with prescribing clinician: 6/28/2023     Additional details provided by patient: PATIENT CALLED STATES THAT HER OTHER HEADACHE MEDICATION DOES FINE HOWEVER SHE IS REQUESTING DR ADAM SEND THIS INTO THE PHARM AS SHE HAS BEEN GETTING GROUP MIGRAINES THAT WITH LAST ABOUT 4 DAYS.     PLEASE ADVISE     Does the patient have less than a 3 day supply:  [x] Yes  [] No    Would you like a call back once the refill request has been completed: [] Yes [x] No    If the office needs to give you a call back, can they leave a voicemail: [] Yes [x] No    Sam Logan Rep   05/08/23 10:37 EDT

## 2023-06-12 RX ORDER — AMITRIPTYLINE HYDROCHLORIDE 25 MG/1
25 TABLET, FILM COATED ORAL NIGHTLY
Qty: 30 TABLET | Refills: 1 | Status: SHIPPED | OUTPATIENT
Start: 2023-06-12

## 2023-06-12 NOTE — TELEPHONE ENCOUNTER
Rx Refill Note  Requested Prescriptions     Pending Prescriptions Disp Refills    amitriptyline (ELAVIL) 25 MG tablet [Pharmacy Med Name: AMITRIPTYLINE HCL TABS 25MG] 30 tablet 1     Sig: Take 1 tablet by mouth Every Night.      Last filled:06/02/2022 with 11 refills  Last office visit with prescribing clinician: 12/5/2022      Next office visit with prescribing clinician: 6/28/2023     VICKY CONWAY  06/12/23, 10:32 EDT

## 2023-06-13 DIAGNOSIS — J47.9 BRONCHIECTASIS WITHOUT ACUTE EXACERBATION: ICD-10-CM

## 2023-06-13 DIAGNOSIS — J98.4 RESTRICTIVE LUNG DISEASE: ICD-10-CM

## 2023-06-13 RX ORDER — ALBUTEROL SULFATE 2.5 MG/3ML
SOLUTION RESPIRATORY (INHALATION)
Qty: 360 ML | Refills: 6 | Status: SHIPPED | OUTPATIENT
Start: 2023-06-13

## 2023-08-09 RX ORDER — CYCLOBENZAPRINE HCL 5 MG
7.5 TABLET ORAL NIGHTLY PRN
Qty: 45 TABLET | Refills: 5 | Status: SHIPPED | OUTPATIENT
Start: 2023-08-09

## 2023-08-16 RX ORDER — AMITRIPTYLINE HYDROCHLORIDE 25 MG/1
TABLET, FILM COATED ORAL
Qty: 30 TABLET | Refills: 0 | Status: SHIPPED | OUTPATIENT
Start: 2023-08-16

## 2023-08-16 NOTE — TELEPHONE ENCOUNTER
Rx Refill Note  Requested Prescriptions     Pending Prescriptions Disp Refills    amitriptyline (ELAVIL) 25 MG tablet [Pharmacy Med Name: AMITRIPTYLINE HCL TABS 25MG] 30 tablet 11     Sig: TAKE 1 TABLET EVERY NIGHT      Last filled: 06/12/2023 w/1 refill  Last office visit with prescribing clinician: 6/28/2023      Next office visit with prescribing clinician: 9/12/2023     VICKY CONWAY  08/16/23, 15:39 EDT

## 2023-09-12 ENCOUNTER — CLINICAL SUPPORT (OUTPATIENT)
Dept: NEUROLOGY | Facility: CLINIC | Age: 68
End: 2023-09-12
Payer: MEDICARE

## 2023-09-12 VITALS — DIASTOLIC BLOOD PRESSURE: 106 MMHG | SYSTOLIC BLOOD PRESSURE: 150 MMHG

## 2023-09-12 DIAGNOSIS — M54.81 CERVICO-OCCIPITAL NEURALGIA: Primary | ICD-10-CM

## 2023-09-12 RX ORDER — AMITRIPTYLINE HYDROCHLORIDE 25 MG/1
37.5 TABLET, FILM COATED ORAL NIGHTLY
Qty: 45 TABLET | Refills: 6 | Status: SHIPPED | OUTPATIENT
Start: 2023-09-12

## 2023-09-12 NOTE — PROGRESS NOTES
Procedure note for occipital nerve block-The patient's chart was reviewed.  After obtaining verbal consent the patient was placed in a sitting position with her neck flexed and her chin touching her chest.  Both the left and right occipital areas were prepped with antiseptic solution and injected with lidocaine 2% along with Solu-Medrol 125 mg/vial using a 27-gauge needle.  3 cc of solution was injected at the site of the greater occipital nerve on each side and 2 cc was injected in the lesser occipital nerve on each side.    Patient tolerated the procedure well other than mild dizziness and tingling of her lips which subsided in a few minutes     Of note-patient had an increased frequency of headaches in July and August due to increased tension in her neck.  The pain further worsened after her last Botox on August 2 for dystonia.  However she started taking Flexeril daily which helped.  Is now taking it only as needed.  Also continues to take amitriptyline 25 mg nightly but it is not helping with either the headaches or her sleep therefore I will increase it to 37.5 mg nightly.  Takes Maxalt as needed (about 1 a week).

## 2023-10-17 NOTE — PROGRESS NOTES
"Crystal Carrillo is a 68 y.o. female here for evaluation of   Chief Complaint   Patient presents with    Bronchiectasis without complication     Follow up       Problem list:  Restrictive ventilatory defect  H/o left pneumonectomy, infant  Bronchiectasis  History of Pseudomonas infection  H/o primary TB 2017 treated  SRINIVAS non compliant  Exercise-induced desaturation.  Raynauld's disease  Seasonal allergic rhinitis  Cervical disk disease  Dystonic tremor, Botox injection  Cervico-occipital neuralgia, nerve block September 18, 2020  Chronic occipital headaches  Hypothyroid  SVT  Permanent pacemaker, 2008, battery change, 2015  Left pneumonectomy, 1956, June  Left thoracoplasty, 1971  Childbirth x2  Appendectomy  Vaginal hysterectomy  Umbilical hernia repair  No drug allergies    History of Present Illness    68-year-old woman, non-smoker with previous history of left pneumonectomy at age 7 years for \"cysts\".  She has been tested for cystic fibrosis and was negative.  She subsequently developed active tuberculosis in 2017 and completed standard treatment.  She then developed a Pseudomonas aeruginosa lung infection requiring prolonged antibiotics.  She has a known history of obstructive sleep apnea but no longer requires BiPAP after weight loss.  She wears chronic oxygen at 3-4 L and increases to 6 L with more significant  exertion. Level of exertion is about the same or maybe worse with stairs.  She uses a budesonide nebulizer twice daily and an albuterol nebulizer as needed.       Review of Systems   Respiratory:  Positive for cough and shortness of breath.    Cardiovascular:  Negative for leg swelling.         Current Outpatient Medications:     Acetaminophen (TYLENOL EXTRA STRENGTH PO), Take  by mouth As Needed (When really bad HA and she is not near food- takes a combination of tylenol and sudafed)., Disp: , Rfl:     albuterol (PROVENTIL) (2.5 MG/3ML) 0.083% nebulizer solution, USE (1) VIAL IN NEBULIZER (4) TIMES A " DAY AS NEEDED FOR WHEEZING, Disp: 360 mL, Rfl: 6    amitriptyline (ELAVIL) 25 MG tablet, Take 1.5 tablets by mouth Every Night., Disp: 45 tablet, Rfl: 6    Biotin 10 MG capsule, Take 10,000 mcg by mouth., Disp: , Rfl:     budesonide (PULMICORT) 0.5 MG/2ML nebulizer solution, Take 2 mL by nebulization Daily., Disp: , Rfl:     buPROPion SR (WELLBUTRIN SR) 100 MG 12 hr tablet, , Disp: , Rfl:     celecoxib (CeleBREX) 200 MG capsule, Take 1 capsule by mouth., Disp: , Rfl:     Cholecalciferol 50 MCG (2000 UT) tablet, Take 1 tablet by mouth., Disp: , Rfl:     cyclobenzaprine (FLEXERIL) 5 MG tablet, Take 1.5 tablets by mouth At Night As Needed for Muscle Spasms., Disp: 45 tablet, Rfl: 5    estradiol (ESTRACE) 0.1 MG/GM vaginal cream, Insert 1 g into the vagina., Disp: , Rfl:     fluticasone (FLONASE) 50 MCG/ACT nasal spray, , Disp: , Rfl:     methylPREDNISolone (MEDROL) 4 MG dose pack, Take as directed on package instructions., Disp: 1 each, Rfl: 0    montelukast (SINGULAIR) 10 MG tablet, Take 1 tablet by mouth Every Night. Prn for allergies, Disp: , Rfl:     multivitamin with minerals tablet tablet, Take 1 tablet by mouth Daily., Disp: , Rfl:     pseudoephedrine (SUDAFED) 30 MG tablet, Take 1 tablet by mouth Every 4 (Four) Hours As Needed (When really bad HA and she is not near food- takes a combination of tylenol and sudafed)., Disp: , Rfl:     rizatriptan (MAXALT) 10 MG tablet, Take 1 tablet by mouth As Needed for Migraine (At the onset of her headache.  May repeat in 2 hours if needed.  Do not take over 2 tabs a day or 8 tabs a month)., Disp: 8 tablet, Rfl: 5    Synthroid 75 MCG tablet, Take 1 tablet by mouth Daily., Disp: , Rfl:     Past Medical History:   Diagnosis Date    Allergic rhinitis     Anxiety     Arthritis     Back complaints     Cervical disc disease     COPD (chronic obstructive pulmonary disease)     Hypothyroid     PSVT (paroxysmal supraventricular tachycardia)     PVC's (premature ventricular  "contractions)     Shoulder pain     Sleep apnea     TB (pulmonary tuberculosis) 2017     Past Surgical History:   Procedure Laterality Date    APPENDECTOMY      CHEST SURGERY  1971    thoracoplasty    LAPAROSCOPIC ASSISTED VAGINAL HYSTERECTOMY      LUNG REMOVAL, TOTAL Left     infant for cyst    PACEMAKER IMPLANTATION  2015    [facility]; 2008 first one    UMBILICAL HERNIA REPAIR       Social History     Socioeconomic History    Marital status:     Number of children: 2   Tobacco Use    Smoking status: Never     Passive exposure: Past    Smokeless tobacco: Never   Vaping Use    Vaping Use: Never used   Substance and Sexual Activity    Alcohol use: Never    Drug use: Never    Sexual activity: Defer     Family History   Problem Relation Age of Onset    Intracerebral hemorrhage Mother     Glaucoma Father      Blood pressure 122/94, pulse 72, temperature 97.1 °F (36.2 °C), height 177.8 cm (70\"), weight 70.3 kg (155 lb), SpO2 91%, not currently breastfeeding.    Physical Exam  HENT:      Head: Normocephalic and atraumatic.      Mouth/Throat:      Comments: White plaques pharynx  Cardiovascular:      Rate and Rhythm: Normal rate and regular rhythm.      Heart sounds: No murmur heard.  Pulmonary:      Breath sounds: No wheezing or rhonchi.      Comments: No breath sounds left chest  Lymphadenopathy:      Cervical: No cervical adenopathy.           PFTs:    10/19/23 FVC 1.11L 29%, FEV1 0.78L 26%, ratio 70%    4/28/22 FVC 1.07L 27%, 0.72L 24% ratio 67%, severe obstruction     November 13, 2020, FVC 1.15 L, 30%, FEV1 0.80 L, 27% ratio 70%, severe restriction     May 8, 2020, FVC 1.32 L, 35%, FEV1 0.87 L, 30% ratio 66%, total lung capacity 2.46 L, 42%, diffusion capacity 8.4, 38%, severe restriction and diffusion impairment     Sleep study in 2012 revealed an apnea-hypopnea index of 35 during REM sleep, 10 during non-REM sleep    Radiology:    Lab:    October 17, 2022, sodium 141, potassium 4.3, chloride 101, " bicarbonate 31, BUN 18, creatinine 0.6, glucose 79, calcium 9.4, total protein 7.6, albumin 4.8, total bilirubin 0.5, alk phos 54, AST 30, ALT 26, free T4 1.26, TSH 1.38     April 2022, white count 4.6, hemoglobin 14.5, platelet count 224, 65 polys, 25 lymphs, 7 monos, 2 eos    Diagnoses and all orders for this visit:    1. Restrictive lung disease; chest wall deformity (Primary)    2. Bronchiectasis without complication    3. Seasonal allergic rhinitis due to pollen    4. S/P pneumonectomy, left infant        Discussion:     68-year-old woman with a history of left pneumonectomy as an infant, active TB in 2017 with some residual bronchiectasis, exercise-induced desaturation here for follow-up. Clinically doing well with stable though severe restriction on PFTs. She also has some mild thrush.     -nystatin  -Budesonide nebulized twice daily  -Albuterol nebulizer twice daily and up to 4 times daily as needed  -Supplemental oxygen 3-6 L with exertion, 2 L at night  -remains off bipap  -Incentive spirometry 10 reps twice daily      Susan Long MD

## 2023-10-19 ENCOUNTER — OFFICE VISIT (OUTPATIENT)
Dept: PULMONOLOGY | Facility: CLINIC | Age: 68
End: 2023-10-19
Payer: MEDICARE

## 2023-10-19 VITALS
DIASTOLIC BLOOD PRESSURE: 94 MMHG | TEMPERATURE: 97.1 F | WEIGHT: 155 LBS | OXYGEN SATURATION: 91 % | BODY MASS INDEX: 22.19 KG/M2 | HEIGHT: 70 IN | SYSTOLIC BLOOD PRESSURE: 122 MMHG | HEART RATE: 72 BPM

## 2023-10-19 DIAGNOSIS — J98.4 RESTRICTIVE LUNG DISEASE: Primary | ICD-10-CM

## 2023-10-19 DIAGNOSIS — J47.9 BRONCHIECTASIS WITHOUT COMPLICATION: ICD-10-CM

## 2023-10-19 DIAGNOSIS — J47.9 BRONCHIECTASIS WITHOUT ACUTE EXACERBATION: ICD-10-CM

## 2023-10-19 DIAGNOSIS — Z90.2 S/P PNEUMONECTOMY: ICD-10-CM

## 2023-10-19 DIAGNOSIS — J30.1 SEASONAL ALLERGIC RHINITIS DUE TO POLLEN: ICD-10-CM

## 2023-10-19 RX ORDER — BIOTIN 10000 MCG
10000 CAPSULE ORAL
COMMUNITY

## 2023-10-19 RX ORDER — BUPROPION HYDROCHLORIDE 100 MG/1
TABLET, EXTENDED RELEASE ORAL
COMMUNITY
Start: 2023-10-02

## 2023-10-19 RX ORDER — ALBUTEROL SULFATE 2.5 MG/3ML
2.5 SOLUTION RESPIRATORY (INHALATION)
Qty: 360 ML | Refills: 12 | Status: SHIPPED | OUTPATIENT
Start: 2023-10-19

## 2023-10-19 RX ORDER — BUDESONIDE 0.5 MG/2ML
0.5 INHALANT ORAL 2 TIMES DAILY
Qty: 60 EACH | Refills: 12 | Status: SHIPPED | OUTPATIENT
Start: 2023-10-19

## 2023-10-19 RX ORDER — CELECOXIB 200 MG/1
200 CAPSULE ORAL
COMMUNITY
Start: 2023-09-20 | End: 2023-10-20

## 2023-10-19 NOTE — LETTER
"October 19, 2023       No Recipients    Patient: Crystal Carrillo   YOB: 1955   Date of Visit: 10/19/2023     Dear Dr. Reeves Recipients:    Thank you for referring Crystal Carrillo to me for evaluation. Below are the relevant portions of my assessment and plan of care.    If you have questions, please do not hesitate to call me. I look forward to following Crystal along with you.         Sincerely,        Susan Long MD        CC:   No Recipients      Progress Notes:  Crystal Carrillo is a 68 y.o. female here for evaluation of No chief complaint on file.      Problem list:  Restrictive ventilatory defect  H/o left pneumonectomy, infant  Bronchiectasis  History of Pseudomonas infection  H/o primary TB 2017 treated  SRINIVAS non compliant  Exercise-induced desaturation.  Raynauld's disease  Seasonal allergic rhinitis  Cervical disk disease  Dystonic tremor, Botox injection  Cervico-occipital neuralgia, nerve block September 18, 2020  Chronic occipital headaches  Hypothyroid  SVT  Permanent pacemaker, 2008, battery change, 2015  Left pneumonectomy, 1956, June  Left thoracoplasty, 1971  Childbirth x2  Appendectomy  Vaginal hysterectomy  Umbilical hernia repair  No drug allergies    History of Present Illness    68-year-old woman, non-smoker with previous history of left pneumonectomy at age 7 years for \"cysts\".  She has been tested for cystic fibrosis and was negative.  She subsequently developed active tuberculosis in 2017 and completed standard treatment.  She then developed a Pseudomonas aeruginosa lung infection requiring prolonged antibiotics.  She has a known history of obstructive sleep apnea but no longer requires BiPAP after weight loss.  She wears chronic oxygen at 4 to 6 L.  She uses a budesonide nebulizer twice daily and an albuterol nebulizer as needed.      Review of Systems      Current Outpatient Medications:   •  Acetaminophen (TYLENOL EXTRA STRENGTH PO), Take  by mouth As Needed (When " really bad HA and she is not near food- takes a combination of tylenol and sudafed)., Disp: , Rfl:   •  albuterol (PROVENTIL) (2.5 MG/3ML) 0.083% nebulizer solution, USE (1) VIAL IN NEBULIZER (4) TIMES A DAY AS NEEDED FOR WHEEZING, Disp: 360 mL, Rfl: 6  •  amitriptyline (ELAVIL) 25 MG tablet, Take 1.5 tablets by mouth Every Night., Disp: 45 tablet, Rfl: 6  •  budesonide (PULMICORT) 0.5 MG/2ML nebulizer solution, Take 2 mL by nebulization Daily., Disp: , Rfl:   •  cyclobenzaprine (FLEXERIL) 5 MG tablet, Take 1.5 tablets by mouth At Night As Needed for Muscle Spasms., Disp: 45 tablet, Rfl: 5  •  estradiol (ESTRACE) 0.1 MG/GM vaginal cream, Insert 1 g into the vagina., Disp: , Rfl:   •  fluticasone (FLONASE) 50 MCG/ACT nasal spray, , Disp: , Rfl:   •  methylPREDNISolone (MEDROL) 4 MG dose pack, Take as directed on package instructions., Disp: 1 each, Rfl: 0  •  montelukast (SINGULAIR) 10 MG tablet, Take 1 tablet by mouth Every Night. Prn for allergies, Disp: , Rfl:   •  multivitamin with minerals tablet tablet, Take 1 tablet by mouth Daily., Disp: , Rfl:   •  pseudoephedrine (SUDAFED) 30 MG tablet, Take 1 tablet by mouth Every 4 (Four) Hours As Needed (When really bad HA and she is not near food- takes a combination of tylenol and sudafed)., Disp: , Rfl:   •  rizatriptan (MAXALT) 10 MG tablet, Take 1 tablet by mouth As Needed for Migraine (At the onset of her headache.  May repeat in 2 hours if needed.  Do not take over 2 tabs a day or 8 tabs a month)., Disp: 8 tablet, Rfl: 5  •  Synthroid 75 MCG tablet, Take 1 tablet by mouth Daily., Disp: , Rfl:     Past Medical History:   Diagnosis Date   • Allergic rhinitis    • Anxiety    • Arthritis    • Back complaints    • Cervical disc disease    • COPD (chronic obstructive pulmonary disease)    • Hypothyroid    • PSVT (paroxysmal supraventricular tachycardia)    • PVC's (premature ventricular contractions)    • Shoulder pain    • Sleep apnea    • TB (pulmonary tuberculosis)  2017     Past Surgical History:   Procedure Laterality Date   • APPENDECTOMY     • CHEST SURGERY  1971    thoracoplasty   • LAPAROSCOPIC ASSISTED VAGINAL HYSTERECTOMY     • LUNG REMOVAL, TOTAL Left     infant for cyst   • PACEMAKER IMPLANTATION  2015    [facility]; [facility] first one   • UMBILICAL HERNIA REPAIR       Social History     Socioeconomic History   • Marital status:    • Number of children: 2   Tobacco Use   • Smoking status: Never     Passive exposure: Past   • Smokeless tobacco: Never   Vaping Use   • Vaping Use: Never used   Substance and Sexual Activity   • Alcohol use: Never   • Drug use: Never   • Sexual activity: Defer     Family History   Problem Relation Age of Onset   • Intracerebral hemorrhage Mother    • Glaucoma Father      not currently breastfeeding.    Physical Exam      PFTs:    4/28/22 FVC 1.07L 27%, 0.72L 24% ratio 67%     November 13, 2020, FVC 1.15 L, 30%, FEV1 0.80 L, 27% ratio 70%, severe restriction     May 8, 2020, FVC 1.32 L, 35%, FEV1 0.87 L, 30% ratio 66%, total lung capacity 2.46 L, 42%, diffusion capacity 8.4, 38%, severe restriction and diffusion impairment     Sleep study in 2012 revealed an apnea-hypopnea index of 35 during REM sleep, 10 during non-REM sleep    Radiology:    Lab:    October 17, 2022, sodium 141, potassium 4.3, chloride 101, bicarbonate 31, BUN 18, creatinine 0.6, glucose 79, calcium 9.4, total protein 7.6, albumin 4.8, total bilirubin 0.5, alk phos 54, AST 30, ALT 26, free T4 1.26, TSH 1.38     April 2022, white count 4.6, hemoglobin 14.5, platelet count 224, 65 polys, 25 lymphs, 7 monos, 2 eos    Diagnoses and all orders for this visit:    1. Restrictive lung disease; chest wall deformity (Primary)    2. Bronchiectasis without complication    3. Seasonal allergic rhinitis due to pollen    4. S/P pneumonectomy, left infant        Discussion:     68-year-old woman with a history of left pneumonectomy as an infant, active TB in 2017 with some  residual bronchiectasis, exercise-induced desaturation here for follow-up.    -Budesonide nebulized twice daily  -Albuterol nebulizer twice daily and up to 4 times daily as needed  -Supplemental oxygen 6 L with exertion, 2 L at night  -Monitor off BiPAP  -Incentive spirometry 10 reps twice daily      Susan Long MD

## 2023-10-20 ENCOUNTER — TELEPHONE (OUTPATIENT)
Dept: NEUROLOGY | Facility: CLINIC | Age: 68
End: 2023-10-20
Payer: MEDICARE

## 2023-10-20 RX ORDER — RIZATRIPTAN BENZOATE 10 MG/1
TABLET ORAL
Qty: 24 TABLET | Refills: 5 | Status: SHIPPED | OUTPATIENT
Start: 2023-10-20 | End: 2023-10-20 | Stop reason: SDUPTHER

## 2023-10-20 RX ORDER — RIZATRIPTAN BENZOATE 10 MG/1
TABLET ORAL
Qty: 24 TABLET | Refills: 5 | Status: SHIPPED | OUTPATIENT
Start: 2023-10-20

## 2023-10-20 NOTE — TELEPHONE ENCOUNTER
Rx Refill Note  Requested Prescriptions      No prescriptions requested or ordered in this encounter      Last filled:  Last office visit with prescribing clinician: 6/28/2023      Next office visit with prescribing clinician: 12/15/2023     Ramona Freedman MA  10/20/23, 14:20 EDT

## 2023-10-20 NOTE — TELEPHONE ENCOUNTER
3}    Medication requested (name and dose):      RIZATRIPTAN 10 MG TABLET  TAKE 1 TABLET BY MOUTH AS NEEDED FOR MIGRAINE    Pharmacy where request should be sent:       [facility] - [address] [phone] [facility] [phone] FX     Additional details provided by patient:  PRESCRIPTION WILL NEED TO BE RENEWED AS WELL.    Best call back number:  [phone]    Does the patient have less than a 3 day supply:  [] Yes  [x] No    Sam Mancera Rep  10/20/23, 11:47 EDT

## 2023-10-20 NOTE — TELEPHONE ENCOUNTER
Rx Refill Note  Requested Prescriptions     Pending Prescriptions Disp Refills    rizatriptan (MAXALT) 10 MG tablet [Pharmacy Med Name: RIZATRIPTAN BENZOATE TABS 1'S 10MG] 24 tablet 11     Sig: TAKE 1 TABLET MAY REPEAT IN 2 HOURS IF NEEDED      Last filled:09/27/2022  Last office visit with prescribing clinician: 6/28/2023      Next office visit with prescribing clinician: 10/20/2023     Ramona Freedman MA  10/20/23, 13:39 EDT    Sent the Rx to patients pharmacy with 5 refills.

## 2023-12-08 ENCOUNTER — CLINICAL SUPPORT (OUTPATIENT)
Dept: NEUROLOGY | Facility: CLINIC | Age: 68
End: 2023-12-08
Payer: MEDICARE

## 2023-12-08 DIAGNOSIS — M62.838 MUSCLE SPASMS OF NECK: ICD-10-CM

## 2023-12-08 DIAGNOSIS — M54.2 NECK PAIN: ICD-10-CM

## 2023-12-08 DIAGNOSIS — M54.81 CERVICO-OCCIPITAL NEURALGIA: Primary | ICD-10-CM

## 2023-12-08 RX ORDER — CYCLOBENZAPRINE HCL 5 MG
7.5 TABLET ORAL NIGHTLY PRN
Qty: 45 TABLET | Refills: 5 | Status: SHIPPED | OUTPATIENT
Start: 2023-12-08

## 2023-12-08 RX ORDER — GABAPENTIN 100 MG/1
300 CAPSULE ORAL NIGHTLY
Qty: 90 CAPSULE | Refills: 5 | Status: SHIPPED | OUTPATIENT
Start: 2023-12-08 | End: 2024-12-07

## 2023-12-08 RX ORDER — AMITRIPTYLINE HYDROCHLORIDE 25 MG/1
25 TABLET, FILM COATED ORAL NIGHTLY
Qty: 30 TABLET | Refills: 6 | Status: SHIPPED | OUTPATIENT
Start: 2023-12-08

## 2023-12-08 RX ORDER — RIZATRIPTAN BENZOATE 10 MG/1
TABLET ORAL
Qty: 24 TABLET | Refills: 5 | Status: SHIPPED | OUTPATIENT
Start: 2023-12-08

## 2023-12-08 NOTE — PROGRESS NOTES
Procedure note for occipital nerve block-The patient's chart was reviewed.  After obtaining verbal consent the patient was placed in a sitting position with her neck flexed and her chin touching her chest.  Both the left and right occipital areas were prepped with antiseptic solution and injected with lidocaine 2% along with Solu-Medrol 125 mg/vial using a 27-gauge needle.  3 cc of solution was injected at the site of the greater occipital nerve on each side and 2 cc was injected in the lesser occipital nerve on each side.        Of note-her neck pain and headaches have increased this past month despite increasing her dose of amitriptyline to 37.5 mg nightly which is helping with her sleep but not with her headaches.  She also had neck weakness with inability to hold the head up for about 1 month after her last Botox on November 1.  Continues to take Flexeril 7.5 mg as needed as well as Maxalt as needed.  I will start her on gabapentin today gradually increasing to 300 mg nightly.  I have told her to back down on the amitriptyline to 25 mg nightly.  I will also give her a PT referral for her neck pain.

## 2023-12-18 DIAGNOSIS — M54.81 CERVICO-OCCIPITAL NEURALGIA: Primary | ICD-10-CM

## 2023-12-19 RX ORDER — METHYLPREDNISOLONE 4 MG/1
TABLET ORAL
Qty: 21 EACH | Refills: 0 | Status: SHIPPED | OUTPATIENT
Start: 2023-12-19

## 2023-12-19 NOTE — TELEPHONE ENCOUNTER
Please let patient know I approved the refill for the medrol dose pack. She last had this in July. I hope she feels better quickly. Thanks, Ambreen

## 2024-03-19 ENCOUNTER — CLINICAL SUPPORT (OUTPATIENT)
Dept: NEUROLOGY | Facility: CLINIC | Age: 69
End: 2024-03-19
Payer: MEDICARE

## 2024-03-19 ENCOUNTER — TELEPHONE (OUTPATIENT)
Dept: NEUROLOGY | Facility: CLINIC | Age: 69
End: 2024-03-19

## 2024-03-19 VITALS
HEIGHT: 70 IN | DIASTOLIC BLOOD PRESSURE: 68 MMHG | OXYGEN SATURATION: 99 % | HEART RATE: 95 BPM | WEIGHT: 152 LBS | SYSTOLIC BLOOD PRESSURE: 108 MMHG | BODY MASS INDEX: 21.76 KG/M2

## 2024-03-19 DIAGNOSIS — M54.81 CERVICO-OCCIPITAL NEURALGIA: Primary | ICD-10-CM

## 2024-03-19 RX ORDER — GABAPENTIN 300 MG/1
300 CAPSULE ORAL
COMMUNITY
End: 2024-03-19 | Stop reason: SDUPTHER

## 2024-03-19 RX ORDER — GABAPENTIN 300 MG/1
300 CAPSULE ORAL NIGHTLY
Qty: 30 CAPSULE | Refills: 5 | Status: SHIPPED | OUTPATIENT
Start: 2024-03-19

## 2024-03-19 RX ORDER — AMITRIPTYLINE HYDROCHLORIDE 25 MG/1
25 TABLET, FILM COATED ORAL NIGHTLY
Qty: 30 TABLET | Refills: 6 | Status: SHIPPED | OUTPATIENT
Start: 2024-03-19

## 2024-03-19 NOTE — TELEPHONE ENCOUNTER
Patient asked for letter for her daughter is , Nancy, stating that she provided transportation to patient for her appointment. Patient stated that it was okay for letter to have her (patient) protected health information on the letter (I.e. name and ).

## 2024-03-19 NOTE — PROGRESS NOTES
Procedure note for occipital nerve block-The patient's chart was reviewed.  After obtaining verbal consent the patient was placed in a sitting position with her neck flexed and her chin touching her chest.  Both the left and right occipital areas were prepped with antiseptic solution and injected with lidocaine 2% along with Solu-Medrol 125 mg/vial using a 27-gauge needle.  3 cc of solution was injected at the site of the greater occipital nerve on each side and 2 cc was injected in the lesser occipital nerve on each side.        Of note-patient states that her neck pain has improved since starting gabapentin 300 mg nightly.  She is also sleeping better at night.  Also continues to take amitriptyline 25 mg nightly along with Flexeril and Maxalt as needed.  She recently went 10 days without a headache but for the past few days has had a headache again.  Did get her Botox recently for the dystonic tremor.  Denies having any significant weakness this time.  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  I have told her to get a complete eye examination.

## 2024-04-16 RX ORDER — CYCLOBENZAPRINE HCL 5 MG
5 TABLET ORAL NIGHTLY PRN
Qty: 60 TABLET | Refills: 5 | Status: SHIPPED | OUTPATIENT
Start: 2024-04-16

## 2024-04-16 NOTE — TELEPHONE ENCOUNTER
Rx Refill Note  Requested Prescriptions     Pending Prescriptions Disp Refills    cyclobenzaprine (FLEXERIL) 5 MG tablet [Pharmacy Med Name: CYCLOBENZAPRINE HCL TABS 5MG] 60 tablet 5     Sig: TAKE 1 TABLET AT NIGHT AS NEEDED FOR MUSCLE SPASMS      Last filled:12/08/23 WITH 5 REFILLS  Last office visit with prescribing clinician: 6/28/2023      Next office visit with prescribing clinician: 6/14/2024     Ramona Freedman MA  04/16/24, 15:56 EDT

## 2024-06-14 ENCOUNTER — PRIOR AUTHORIZATION (OUTPATIENT)
Dept: NEUROLOGY | Facility: CLINIC | Age: 69
End: 2024-06-14

## 2024-06-19 ENCOUNTER — TELEPHONE (OUTPATIENT)
Dept: NEUROLOGY | Facility: CLINIC | Age: 69
End: 2024-06-19
Payer: MEDICARE

## 2024-06-19 RX ORDER — METHYLPREDNISOLONE 4 MG/1
TABLET ORAL
Qty: 1 EACH | Refills: 0 | Status: SHIPPED | OUTPATIENT
Start: 2024-06-19

## 2024-06-19 RX ORDER — METHYLPREDNISOLONE 4 MG/1
TABLET ORAL
Qty: 1 EACH | Refills: 0 | Status: SHIPPED | OUTPATIENT
Start: 2024-06-19 | End: 2024-06-19 | Stop reason: SDUPTHER

## 2024-06-19 NOTE — TELEPHONE ENCOUNTER
Crystal Carrillo  [phone]  LVM IF NEEDED         PT STATES SHE SEEN THE EYE DOCTOR IN EARLY MAY 2024 AND WAS ABLE TO GET A NEW PRESCRIPTION, SHE STATES FOR THE FIRST SEVERAL WEEKS SHE HAD LESS HEADACHES. SINCE JUNE 1ST SHE HAS HAD TO TAKE 8 MAXALT SO FAR IN THE PAST 19 DAYS. CAN SHE PLEASE GET A STEROID DOSE PACK TO BREAK HER CURRENT HEADACHE/MIGRAINE CYCLE.     [address] [phone]  - [phone] FX

## 2024-06-19 NOTE — TELEPHONE ENCOUNTER
Rx Refill Note  Requested Prescriptions     Pending Prescriptions Disp Refills    methylPREDNISolone (MEDROL) 4 MG dose pack 1 each 0     Sig: Take as directed on package instructions.      Last filled:6/19/24 0 refill  Last office visit with prescribing clinician: 6/28/2023      Next office visit with prescribing clinician: 8/16/2024     VICKY CONWAY  06/19/24, 15:01 EDT    Sent to [facility] pharmacy-sending to [place] per patient request

## 2024-08-16 ENCOUNTER — CLINICAL SUPPORT (OUTPATIENT)
Dept: NEUROLOGY | Facility: CLINIC | Age: 69
End: 2024-08-16
Payer: MEDICARE

## 2024-08-16 DIAGNOSIS — M54.81 CERVICO-OCCIPITAL NEURALGIA: Primary | ICD-10-CM

## 2024-08-16 RX ORDER — CYCLOBENZAPRINE HCL 5 MG
5 TABLET ORAL NIGHTLY PRN
Qty: 60 TABLET | Refills: 5 | Status: SHIPPED | OUTPATIENT
Start: 2024-08-16

## 2024-08-16 RX ORDER — GABAPENTIN 300 MG/1
300 CAPSULE ORAL 2 TIMES DAILY
Qty: 60 CAPSULE | Refills: 5 | Status: SHIPPED | OUTPATIENT
Start: 2024-08-16

## 2024-08-16 RX ORDER — AMITRIPTYLINE HYDROCHLORIDE 25 MG/1
25 TABLET, FILM COATED ORAL NIGHTLY
Qty: 30 TABLET | Refills: 6 | Status: SHIPPED | OUTPATIENT
Start: 2024-08-16

## 2024-08-16 NOTE — PROGRESS NOTES
Procedure note for occipital nerve block-The patient's chart was reviewed.  After obtaining verbal consent the patient was placed in a sitting position with her neck flexed and her chin touching her chest.  Both the left and right occipital areas were prepped with antiseptic solution and injected with lidocaine 2% along with Solu-Medrol 125 mg/vial using a 27-gauge needle.  3 cc of solution was injected at the site of the greater occipital nerve on each side and 2 cc was injected in the lesser occipital nerve on each side.        Of note-patient states she was having excruciating headaches in June that went away by taking a Medrol Dosepak.  She attributes this to stress as her  had an MI in April requiring a stent.  Therefore she could not come for her nerve block appointment or her Botox for her dystonic tremor.  She continues to take amitriptyline 25 mg nightly in addition to Flexeril 5 to 10 mg a day and gabapentin 300 mg nightly but is wanting to increase her dose to 300 mg twice daily to see if that would help as she is having more neck pain/spasms.  I will increase her dose of gabapentin.  I have also told her to schedule an appointment for Botox.

## 2024-11-21 ENCOUNTER — OFFICE VISIT (OUTPATIENT)
Dept: NEUROLOGY | Facility: CLINIC | Age: 69
End: 2024-11-21
Payer: MEDICARE

## 2024-11-21 VITALS
WEIGHT: 155 LBS | HEIGHT: 70 IN | OXYGEN SATURATION: 96 % | DIASTOLIC BLOOD PRESSURE: 96 MMHG | BODY MASS INDEX: 22.19 KG/M2 | SYSTOLIC BLOOD PRESSURE: 136 MMHG | HEART RATE: 85 BPM

## 2024-11-21 DIAGNOSIS — M54.81 CERVICO-OCCIPITAL NEURALGIA: Primary | ICD-10-CM

## 2024-11-21 RX ORDER — AMLODIPINE BESYLATE 2.5 MG/1
2.5 TABLET ORAL DAILY
COMMUNITY

## 2024-11-21 NOTE — PROGRESS NOTES
Procedure note for occipital nerve block-The patient's chart was reviewed.  After obtaining verbal consent the patient was placed in a sitting position with her neck flexed and her chin touching her chest.  Both the left and right occipital areas were prepped with antiseptic solution and injected with lidocaine 2% along with Solu-Medrol 125 mg/vial using a 27-gauge needle.  3 cc of solution was injected at the site of the greater occipital nerve on each side and 2 cc was injected in the lesser occipital nerve on each side.        Of note-patient states that her headaches have been well-controlled this month.  She did have a prolonged 4-day long headache for which she took Maxalt.  I have also given her a sample of Zavzpret spray to try.  She cut back on her dose of gabapentin from 300 mg twice daily to 300 mg nightly as the higher dose was causing her to have memory problems.  She also continues to take amitriptyline 25 mg nightly in addition to Flexeril 5 to 10 mg a day as needed.  For her dystonic tremor she stopped seeing Dr. Isaacs.  At her next visit we will have Dr. Monterroso assess her so that she can continue getting Botox

## 2024-12-10 ENCOUNTER — TELEPHONE (OUTPATIENT)
Dept: NEUROLOGY | Facility: CLINIC | Age: 69
End: 2024-12-10
Payer: MEDICARE

## 2024-12-10 NOTE — TELEPHONE ENCOUNTER
Provider: EDDIE ADAM MD    Caller: Crystal Carrillo    Relationship to Patient: Self    Phone Number: [phone]    Reason for Call: CALLING TO LET PROVIDER KNOW YESTERDAY (12-10-24) PATIENT HAD DIFFICULTY GETTING OUT OF A SITTING POSITION.   STATED SHE COULD NOT USE HER LEGS TO GET HER SELF TO STAND UP.   STATED IT HAS HAPPENED 3 TIMES IN THE LAST MONTH.   STATED YESTERDAY WAS THE MOST RECENT.  STATED SHE WAS AT ANOTHER SPECIALTY OFFICE.   STATED SHE HAD AN EPISODE GETTING OFF OF A LOW TOILET AND HAD TO GET HELP.   STATED SHE COULD DRIVE AND WALK.   STATED SHE WAS HAVING TROUBLE GOING UP STEPS.  STATED THE OTHER PROVIDER TOLD HER NOT TO BE ALONE AND NOT TO DRIVE.   STATED HER SON WAS CALLED AND THEY WENT TO THE ER.  STATED SHE WENT TO [facility] IN [place], [place].  STATED THEY DID A CT SCAN OF HEAD AND BLOOD DRAW.  STATED THEY ALSO DID AN EKG.  STATED THEY COULD FIND A REASON THAT THIS WAS HAPPENING AND TO FOLLOW UP WITH NEUROLOGIST.      STATED AFTER 3 HOURS AFTER THE ORIGINAL INCIDENT SHE WAS ABLE TO GO UP THE STAIRS WITH OUT ASSISTANCE (14 STEPS) AND EVERYTHING WENT BACK TO NORMAL.        CALLING TO SEE IF THE PROVIDER RECOMMENDS SOMETHING AND TO LET PROVIDER KNOW.    STATED SHE IS FINE TODAY.      When was the patient last seen: 11-21-24    PLEASE CALL & ADVISE

## 2024-12-12 NOTE — TELEPHONE ENCOUNTER
Patient notified of lab test Dr. Simons recommended via voicemail and advised patient to have results faxed to our office.

## 2024-12-14 NOTE — TELEPHONE ENCOUNTER
Pt is informed and verbalizes understanding.   [FreeTextEntry1] : #Psoriasis, Chronic, improved overall but flare on posterior scalp  - doing well on Tremfya, continue treatment q8 weeks, SED. Advised patient to inform his oncologist he is on tremfya and if that is an issue when he starts treatment for his myeloma - Continue clobetasol solution for posterior scalp - Reviewed shampoos such as Tsal vs. Selsun blue, vs ketoconazole- advise he switches between a few  #High risk med - will check labs: cbc, cmp, hep b/c serology, quant  RTC 4-6 months

## 2025-01-17 ENCOUNTER — CLINICAL SUPPORT (OUTPATIENT)
Dept: NEUROLOGY | Facility: CLINIC | Age: 70
End: 2025-01-17
Payer: MEDICARE

## 2025-01-17 DIAGNOSIS — M54.81 CERVICO-OCCIPITAL NEURALGIA: Primary | ICD-10-CM

## 2025-01-17 NOTE — PROGRESS NOTES
Procedure note for occipital nerve block-The patient's chart was reviewed.  After obtaining verbal consent the patient was placed in a sitting position with her neck flexed and her chin touching her chest.  Both the left and right occipital areas were prepped with antiseptic solution and injected with bupivacaine using a 27-gauge needle.  3 cc of solution was injected at the site of the greater occipital nerve on each side and 2 cc was injected in the lesser occipital nerve on each side.        Of note-patient states that her headaches have been extremely well-controlled this month.  In fact her last Maxalt use was over 10 days ago.  The Zavzpret spray sample that I have given her also helped.  She continues to take gabapentin 300 mg nightly along with amitriptyline 25 mg nightly and Flexeril 5 to 10 mg only as needed when she has neck spasms.  For her dystonic tremor she has not had Botox in about 1 year but does not feel the need to start it again as her tremor is currently well-controlled.  She also feels that her neck weakness/headaches have improved since stopping the Botox.  She did have an episode of paroxysmal leg weakness a few weeks ago when she had difficulty standing up from a seated position and also climbing stairs but that lasted for a day and has now subsided.  Lower extremity strength on examination today was 5/5.  If it recurs then I may get a CK and myasthenia panel.

## 2025-02-21 ENCOUNTER — TRANSCRIBE ORDERS (OUTPATIENT)
Dept: ADMINISTRATIVE | Facility: HOSPITAL | Age: 70
End: 2025-02-21
Payer: MEDICARE

## 2025-02-21 DIAGNOSIS — Z45.010 ENCOUNTER FOR PACEMAKER AT END OF BATTERY LIFE: Primary | ICD-10-CM

## 2025-02-25 ENCOUNTER — HOSPITAL ENCOUNTER (OUTPATIENT)
Facility: HOSPITAL | Age: 70
Setting detail: HOSPITAL OUTPATIENT SURGERY
Discharge: HOME | End: 2025-02-25
Attending: INTERNAL MEDICINE | Admitting: INTERNAL MEDICINE
Payer: MEDICARE

## 2025-02-25 ENCOUNTER — TELEPHONE (OUTPATIENT)
Dept: NEUROLOGY | Facility: CLINIC | Age: 70
End: 2025-02-25
Payer: MEDICARE

## 2025-02-25 VITALS
BODY MASS INDEX: 22.19 KG/M2 | RESPIRATION RATE: 15 BRPM | OXYGEN SATURATION: 99 % | WEIGHT: 155 LBS | HEIGHT: 70 IN | DIASTOLIC BLOOD PRESSURE: 78 MMHG | HEART RATE: 76 BPM | SYSTOLIC BLOOD PRESSURE: 121 MMHG | TEMPERATURE: 97 F

## 2025-02-25 DIAGNOSIS — Z45.010 ENCOUNTER FOR PACEMAKER AT END OF BATTERY LIFE: ICD-10-CM

## 2025-02-25 LAB
ANION GAP SERPL CALCULATED.3IONS-SCNC: 9 MMOL/L (ref 5–15)
BUN SERPL-MCNC: 16 MG/DL (ref 8–23)
BUN/CREAT SERPL: 20.5 (ref 7–25)
CALCIUM SPEC-SCNC: 9.9 MG/DL (ref 8.6–10.5)
CHLORIDE SERPL-SCNC: 102 MMOL/L (ref 98–107)
CO2 SERPL-SCNC: 31 MMOL/L (ref 22–29)
CREAT SERPL-MCNC: 0.78 MG/DL (ref 0.57–1)
DEPRECATED RDW RBC AUTO: 46.1 FL (ref 37–54)
EGFRCR SERPLBLD CKD-EPI 2021: 82.9 ML/MIN/1.73
ERYTHROCYTE [DISTWIDTH] IN BLOOD BY AUTOMATED COUNT: 13.4 % (ref 12.3–15.4)
GLUCOSE SERPL-MCNC: 97 MG/DL (ref 65–99)
HCT VFR BLD AUTO: 45.1 % (ref 34–46.6)
HGB BLD-MCNC: 14.6 G/DL (ref 12–15.9)
MCH RBC QN AUTO: 30.2 PG (ref 26.6–33)
MCHC RBC AUTO-ENTMCNC: 32.4 G/DL (ref 31.5–35.7)
MCV RBC AUTO: 93.4 FL (ref 79–97)
PLATELET # BLD AUTO: 291 10*3/MM3 (ref 140–450)
PMV BLD AUTO: 10 FL (ref 6–12)
POTASSIUM SERPL-SCNC: 4.6 MMOL/L (ref 3.5–5.2)
RBC # BLD AUTO: 4.83 10*6/MM3 (ref 3.77–5.28)
SODIUM SERPL-SCNC: 142 MMOL/L (ref 136–145)
WBC NRBC COR # BLD AUTO: 5.82 10*3/MM3 (ref 3.4–10.8)

## 2025-02-25 PROCEDURE — 85027 COMPLETE CBC AUTOMATED: CPT | Performed by: INTERNAL MEDICINE

## 2025-02-25 PROCEDURE — 36415 COLL VENOUS BLD VENIPUNCTURE: CPT

## 2025-02-25 PROCEDURE — 80048 BASIC METABOLIC PNL TOTAL CA: CPT | Performed by: INTERNAL MEDICINE

## 2025-02-25 PROCEDURE — 25010000002 MIDAZOLAM PER 1 MG: Performed by: INTERNAL MEDICINE

## 2025-02-25 PROCEDURE — C1785 PMKR, DUAL, RATE-RESP: HCPCS | Performed by: INTERNAL MEDICINE

## 2025-02-25 PROCEDURE — 25010000002 LIDOCAINE PF 1% 1 % SOLUTION: Performed by: INTERNAL MEDICINE

## 2025-02-25 PROCEDURE — 33228 REMV&REPLC PM GEN DUAL LEAD: CPT | Performed by: INTERNAL MEDICINE

## 2025-02-25 PROCEDURE — 25010000002 CEFAZOLIN PER 500 MG: Performed by: INTERNAL MEDICINE

## 2025-02-25 PROCEDURE — 25010000002 DIPHENHYDRAMINE PER 50 MG: Performed by: INTERNAL MEDICINE

## 2025-02-25 PROCEDURE — 25010000002 FENTANYL CITRATE (PF) 50 MCG/ML SOLUTION: Performed by: INTERNAL MEDICINE

## 2025-02-25 DEVICE — GEN PM AZURE XT SURESCAN DR MRI: Type: IMPLANTABLE DEVICE | Site: CHEST | Status: FUNCTIONAL

## 2025-02-25 RX ORDER — SODIUM CHLORIDE 9 MG/ML
250 INJECTION, SOLUTION INTRAVENOUS CONTINUOUS
Status: ACTIVE | OUTPATIENT
Start: 2025-02-25 | End: 2025-02-25

## 2025-02-25 RX ORDER — DIPHENHYDRAMINE HYDROCHLORIDE 50 MG/ML
INJECTION INTRAMUSCULAR; INTRAVENOUS
Status: DISCONTINUED | OUTPATIENT
Start: 2025-02-25 | End: 2025-02-25 | Stop reason: HOSPADM

## 2025-02-25 RX ORDER — SODIUM CHLORIDE 0.9 % (FLUSH) 0.9 %
10 SYRINGE (ML) INJECTION EVERY 12 HOURS SCHEDULED
Status: DISCONTINUED | OUTPATIENT
Start: 2025-02-25 | End: 2025-02-25 | Stop reason: HOSPADM

## 2025-02-25 RX ORDER — MIDAZOLAM HYDROCHLORIDE 1 MG/ML
INJECTION, SOLUTION INTRAMUSCULAR; INTRAVENOUS
Status: DISCONTINUED | OUTPATIENT
Start: 2025-02-25 | End: 2025-02-25 | Stop reason: HOSPADM

## 2025-02-25 RX ORDER — FENTANYL CITRATE 50 UG/ML
INJECTION, SOLUTION INTRAMUSCULAR; INTRAVENOUS
Status: DISCONTINUED | OUTPATIENT
Start: 2025-02-25 | End: 2025-02-25 | Stop reason: HOSPADM

## 2025-02-25 RX ORDER — HYDROCODONE BITARTRATE AND ACETAMINOPHEN 7.5; 325 MG/1; MG/1
1 TABLET ORAL EVERY 4 HOURS PRN
Status: DISCONTINUED | OUTPATIENT
Start: 2025-02-25 | End: 2025-02-25 | Stop reason: HOSPADM

## 2025-02-25 RX ORDER — ACETAMINOPHEN 325 MG/1
650 TABLET ORAL EVERY 4 HOURS PRN
Status: DISCONTINUED | OUTPATIENT
Start: 2025-02-25 | End: 2025-02-25 | Stop reason: HOSPADM

## 2025-02-25 RX ORDER — SODIUM CHLORIDE 0.9 % (FLUSH) 0.9 %
10 SYRINGE (ML) INJECTION AS NEEDED
Status: DISCONTINUED | OUTPATIENT
Start: 2025-02-25 | End: 2025-02-25 | Stop reason: HOSPADM

## 2025-02-25 RX ORDER — FUROSEMIDE 40 MG/1
40 TABLET ORAL DAILY
COMMUNITY

## 2025-02-25 RX ORDER — LIDOCAINE HYDROCHLORIDE 10 MG/ML
INJECTION, SOLUTION EPIDURAL; INFILTRATION; INTRACAUDAL; PERINEURAL
Status: DISCONTINUED | OUTPATIENT
Start: 2025-02-25 | End: 2025-02-25 | Stop reason: HOSPADM

## 2025-02-25 RX ORDER — SODIUM CHLORIDE 9 MG/ML
40 INJECTION, SOLUTION INTRAVENOUS AS NEEDED
Status: DISCONTINUED | OUTPATIENT
Start: 2025-02-25 | End: 2025-02-25 | Stop reason: HOSPADM

## 2025-02-25 RX ADMIN — HYDROCODONE BITARTRATE AND ACETAMINOPHEN 1 TABLET: 7.5; 325 TABLET ORAL at 10:08

## 2025-02-25 NOTE — DISCHARGE INSTRUCTIONS
DR. MANNING DEVICE GENERATOR CHANGE  Instructions for the Patient Following a Pacemaker or ICD Implantation:    1. Our office will schedule a wound check appointment in 7-10 days.   2. Leave dressing place. The office will remove it when you go to your wound check   appointment.   3. Hold your blood thinner (Xarelto, Eliquis, Coumadin/Warfarin) for 4 days post   implantation unless otherwise instructed. You may resume taking after that time frame.   4. NO driving until you follow up for your wound check.   5. Do NOT lift more than 5 lbs or abduct/raise the arm above your shoulder for 2 weeks   after implant.   6. Avoid bra straps/suspenders over the incision until it is completely healed.   7. No showering or getting the incision wet for 2 days after the procedure. When showering   let the water hit your back and pat the area dry. Avoid pools, lakes and hot tubs until the   incision is completely healed.   8. Never put lotion/salve/cream/ointment/powder over the incision.   9. You may take Tylenol/ibuprofen/Aleve for pain management.   10. Notify our office of the following:   Any swelling, redness or discharge around the incision.    If you notice anything unusual or unexpected.    If you develop a fever (101 degrees) that doesn’t go away within 2-3 days.    If your incision opens up/dehisces/ does not heal completely.

## 2025-02-25 NOTE — Clinical Note
Number of grounding pads used: 1  Location of grounding pads: LEFT UPPER LEG  SMOKE EVACUATING PEN

## 2025-02-25 NOTE — TELEPHONE ENCOUNTER
PATIENT HAD NEW PAC MAKER INSTALLED TODAY.  SINCE, SHE HAS HAD HEADACHE.    SHE HAS TAKEN 2 - rizatriptan (MAXALT) 10 MG tablet BUT NO RELIEF.    SHE THINKS MAY BE THIS IS A RESULT OF ANESTHESIA.    PLEASE ADVISE

## 2025-02-25 NOTE — H&P
[facility] History & Physical    Referring Provider: Waldo Wiseman MD    Patient Care Team:  Rhina Tomas DO as PCP - General (Family Medicine)  Ralph Isaacs MD (Psychiatry)  Jannette Simons MD as Consulting Physician (Neurology)  Kaushik Torres MD as Consulting Physician (Cardiology)  Susan Long MD as Consulting Physician (Pulmonary Disease)    Chief complaint-- pacemaker generator at [facility]    Subjective .     History of present illness: 69-year-old lady with a history of sick sinus syndrome and a Medtronic dual-chamber pacemaker has been followed in the office by Dr. Torres for battery surveillance.  She was recently noted to have reached [facility] on interrogation of the device in the office and she is now admitted for pacemaker generator change.  She has no history of recent illness, fever cough or night sweats.  She is agreeable to generator change.    Review of System  A 14 point review of systems was negative except as was stated in the HPI    History  Past Medical History:   Diagnosis Date    Allergic rhinitis     Anxiety     Arthritis     Back complaints     Cervical disc disease     COPD (chronic obstructive pulmonary disease)     Hypothyroid     PSVT (paroxysmal supraventricular tachycardia)     PVC's (premature ventricular contractions)     Shoulder pain     Sleep apnea     TB (pulmonary tuberculosis) 2017     Past Surgical History:   Procedure Laterality Date    APPENDECTOMY      CHEST SURGERY  1971    thoracoplasty    LAPAROSCOPIC ASSISTED VAGINAL HYSTERECTOMY      LUNG REMOVAL, TOTAL Left     infant for cyst    PACEMAKER IMPLANTATION  2015    [facility]; 2008 first one    UMBILICAL HERNIA REPAIR       Family History   Problem Relation Age of Onset    Intracerebral hemorrhage Mother     Glaucoma Father      Social History     Tobacco Use    Smoking status: Never     Passive exposure: Past    Smokeless tobacco: Never   Vaping Use    Vaping status: Never  Used   Substance Use Topics    Alcohol use: Never    Drug use: Never     Medications Prior to Admission   Medication Sig Dispense Refill Last Dose/Taking    furosemide (LASIX) 40 MG tablet Take 1 tablet by mouth Daily.   Taking    albuterol (PROVENTIL) (2.5 MG/3ML) 0.083% nebulizer solution Take 2.5 mg by nebulization 4 (Four) Times a Day. (Patient taking differently: Take 2.5 mg by nebulization 2 (Two) Times a Day.) 360 mL 12 2/24/2025    amitriptyline (ELAVIL) 25 MG tablet Take 1 tablet by mouth Every Night. 30 tablet 6 2/24/2025    amLODIPine (NORVASC) 2.5 MG tablet Take 1 tablet by mouth Daily.   2/24/2025    budesonide (PULMICORT) 0.5 MG/2ML nebulizer solution Take 2 mL by nebulization 2 (Two) Times a Day. 60 each 12 Taking    buPROPion SR (WELLBUTRIN SR) 150 MG 12 hr tablet Take 2 tablets by mouth Every Morning.   Taking    cyclobenzaprine (FLEXERIL) 5 MG tablet Take 1 tablet by mouth At Night As Needed for Muscle Spasms. 60 tablet 5 2/23/2025    estradiol (ESTRACE) 0.1 MG/GM vaginal cream Insert 1 g into the vagina. Every 4 days   Taking    fluticasone (FLONASE) 50 MCG/ACT nasal spray As Needed.   Taking As Needed    gabapentin (NEURONTIN) 300 MG capsule Take 1 capsule by mouth 2 (Two) Times a Day. (Patient taking differently: Take 1 capsule by mouth Every Night.) 60 capsule 5 Taking Differently    methylPREDNISolone (MEDROL) 4 MG dose pack Take as directed on package instructions. (Patient taking differently: As Needed. Take as directed on package instructions.) 1 each 0 Taking Differently    montelukast (SINGULAIR) 10 MG tablet Take 1 tablet by mouth Every Night. Prn for allergies   2/24/2025    rizatriptan (MAXALT) 10 MG tablet May repeat in 2 hours if needed 24 tablet 5 Taking    Synthroid 75 MCG tablet Take 1 tablet by mouth Daily.   2/24/2025     Scheduled Meds:  ceFAZolin, 2,000 mg, Intravenous, Once      Continuous Infusions:     PRN Meds:    Allergies:  Patient has no known allergies.    Objective  "    Vital Sign Min/Max for last 24 hours  Temp  Min: 97 °F (36.1 °C)  Max: 97 °F (36.1 °C)   BP  Min: 125/77  Max: 145/98   Pulse  Min: 82  Max: 82   Resp  Min: 14  Max: 14   SpO2  Min: 98 %  Max: 99 %   No data recorded   Weight  Min: 70.3 kg (155 lb)  Max: 70.3 kg (155 lb)     Flowsheet Rows      Flowsheet Row First Filed Value   Admission Height 177.8 cm (70\") Documented at 02/25/2025 0640   Admission Weight 70.3 kg (155 lb) Documented at 02/25/2025 0640               Physical Exam:  General Appearance: Alert, appears stated age and cooperative  Lungs: Clear to ascultation  Heart:: RRR  No Murmurs, Rubs or Gallops  Abdomen: Soft and nontender with adequate bowel sounds. No organomegaly  Extremities: No cyanosis, clubbing or edema  Pulses: Pulses palpable and equal bilaterally  Skin: Warm and dry with no rash  Psych: Normal    Results Review:   I reviewed the patient's new clinical results.  Results from last 7 days   Lab Units 02/25/25  0644   WBC 10*3/mm3 5.82   HEMOGLOBIN g/dL 14.6   HEMATOCRIT % 45.1   PLATELETS 10*3/mm3 291         No results found for: \"TROPONINT\"                      Encounter for pacemaker at end of battery life      Impression      Sick sinus syndrome with Medtronic dual-chamber pacemaker at [facility]  History of previous left thoracotomy  Hypertension  Hypothyroidism        Plan     Pacemaker generator change      I discussed the patient's findings and my recommendations with patient    Waldo Wiseman MD   02/25/25  07:07 EST            "

## 2025-02-26 RX ORDER — METHYLPREDNISOLONE 4 MG/1
TABLET ORAL
Qty: 1 EACH | Refills: 0 | Status: SHIPPED | OUTPATIENT
Start: 2025-02-26

## 2025-03-21 ENCOUNTER — CLINICAL SUPPORT (OUTPATIENT)
Dept: NEUROLOGY | Facility: CLINIC | Age: 70
End: 2025-03-21
Payer: MEDICARE

## 2025-03-21 VITALS
HEIGHT: 70 IN | WEIGHT: 155 LBS | HEART RATE: 97 BPM | SYSTOLIC BLOOD PRESSURE: 144 MMHG | DIASTOLIC BLOOD PRESSURE: 90 MMHG | BODY MASS INDEX: 22.19 KG/M2 | OXYGEN SATURATION: 98 %

## 2025-03-21 DIAGNOSIS — M54.81 CERVICO-OCCIPITAL NEURALGIA: Primary | ICD-10-CM

## 2025-03-21 RX ORDER — CYCLOBENZAPRINE HCL 10 MG
10 TABLET ORAL 2 TIMES DAILY PRN
Qty: 180 TABLET | Refills: 1 | Status: SHIPPED | OUTPATIENT
Start: 2025-03-21

## 2025-03-21 RX ORDER — SERTRALINE HYDROCHLORIDE 100 MG/1
100 TABLET, FILM COATED ORAL DAILY
COMMUNITY

## 2025-03-21 RX ORDER — GABAPENTIN 300 MG/1
300 CAPSULE ORAL NIGHTLY
Qty: 30 CAPSULE | Refills: 5 | Status: SHIPPED | OUTPATIENT
Start: 2025-03-21

## 2025-03-21 NOTE — PROGRESS NOTES
Procedure note for occipital nerve block-The patient's chart was reviewed.  After obtaining verbal consent the patient was placed in a sitting position with her neck flexed and her chin touching her chest.  Both the left and right occipital areas were prepped with antiseptic solution and injected with bupivacaine using a 27-gauge needle.  3 cc of solution was injected at the site of the greater occipital nerve on each side and 2 cc was injected in the lesser occipital nerve on each side.  I did give her trigger point shots in the right trapezius with 1 cc of bupivacaine.      Of note-patient states that her headaches have been extremely well-controlled the past 3 months. She continues to take gabapentin 300 mg nightly along with amitriptyline 25 mg nightly and Flexeril 5 to 10 mg only as needed when she has neck spasms but has not taken any Flexeril for the past few weeks.  Has been having severe right shoulder pain for which she will soon have a Kenalog shot.  For her dystonic tremor she has stopped getting Botox but her tremors have been well-controlled and that she is stressed out.  She also had her pacemaker replaced 3 weeks ago which went off well.  Has also been started on Zoloft 100 mg daily by her psychiatrist due to lack of motivation.

## 2025-04-23 RX ORDER — RIZATRIPTAN BENZOATE 10 MG/1
TABLET ORAL
Qty: 24 TABLET | Refills: 5 | Status: SHIPPED | OUTPATIENT
Start: 2025-04-23

## 2025-04-23 NOTE — TELEPHONE ENCOUNTER
Caller: Crystal Carrillo    Relationship: Self    Best call back number:   Telephone Information:   Mobile [phone]         Requested Prescriptions:   Requested Prescriptions     Pending Prescriptions Disp Refills    rizatriptan (MAXALT) 10 MG tablet 24 tablet 5     Sig: May repeat in 2 hours if needed        Pharmacy where request should be sent: [address] - [phone]  - [phone] FX     Last office visit with prescribing clinician: 11/21/2024   Last telemedicine visit with prescribing clinician: Visit date not found   Next office visit with prescribing clinician: 6/20/2025     Additional details provided by patient: PT IS NOW COMPLETELY OUT AS OF THIS MORNING.     Does the patient have less than a 3 day supply:  [x] Yes  [] No    Would you like a call back once the refill request has been completed: [] Yes [x] No    If the office needs to give you a call back, can they leave a voicemail: [] Yes [x] No    Sam Doyle Rep   04/23/25 09:56 EDT

## 2025-04-23 NOTE — TELEPHONE ENCOUNTER
Rx Refill Note  Requested Prescriptions     Pending Prescriptions Disp Refills    rizatriptan (MAXALT) 10 MG tablet 24 tablet 5     Sig: May repeat in 2 hours if needed      Last filled:12/8/23 5 REF  Last office visit with prescribing clinician: 11/21/2024      Next office visit with prescribing clinician: 6/20/2025     Lauren Davis MA  04/23/25, 16:08 EDT    Pending to provider

## 2025-06-05 ENCOUNTER — PRIOR AUTHORIZATION (OUTPATIENT)
Dept: NEUROLOGY | Facility: CLINIC | Age: 70
End: 2025-06-05
Payer: MEDICARE

## 2025-06-18 NOTE — TELEPHONE ENCOUNTER
Rx Refill Note  Requested Prescriptions     Pending Prescriptions Disp Refills    amitriptyline (ELAVIL) 25 MG tablet [Pharmacy Med Name: AMITRIPTYLINE HCL 25 MG TAB 25 Tablet] 30 tablet 6     Sig: TAKE ONE (1) TABLET BY MOUTH EVERY NIGHT      Last filled:3/21/25 11 ref  Last office visit with prescribing clinician: 11/21/2024      Next office visit with prescribing clinician: 6/20/2025     Lauren Davis MA  06/18/25, 14:39 EDT

## 2025-06-19 ENCOUNTER — TELEPHONE (OUTPATIENT)
Dept: NEUROLOGY | Facility: CLINIC | Age: 70
End: 2025-06-19

## 2025-06-19 NOTE — TELEPHONE ENCOUNTER
Caller: Crystal Carrillo    Relationship: Self    Best call back number: [phone]    What was the call regarding: PT CALLED IN WITH NEW SYMPTOMS ON LEFT SIDE OF HER FACE AND HANDS. STARED ABOUT 10 DAYS AGO, AND PCP ALREADY CLEARED HER OF ANY INFECTIONS. MENTIONED NUMBNESS AND TINGLING, TRANSFERRED TO OFFICE PER [facility] FOR FURTHER ASSISTANCE    PLEASE REVIEW AND ADVISE

## 2025-06-19 NOTE — TELEPHONE ENCOUNTER
PATIENT HAS NEW PAIN ON THE LEFT SIDE OF HER HEAD. IT'S GOTTEN WORSE IN THE LAST 10 DAYS AND SHE WOULD LIKE TO DISCUSS WITH DR. ADAM TOMORROW WHEN SHE COMES IN FOR HER INJECTION.     SHE HAS NOTICED A LITTLE NUMBNESS OR TINGLING IN HER LEFT HAND WHEN SHE IS LAYING DOWN AND TURNED ON HER LEFT SIDE.    PATIENT WENT TO PCP AND GOT BOTH EARS LOOKED AT.  AND THEY LOOKED FINE, PATIENT THOUGH SHE MIGHT OF HAD AN EAR INFECTION THAT WAS CAUSING PAIN.

## 2025-06-20 ENCOUNTER — CLINICAL SUPPORT (OUTPATIENT)
Dept: NEUROLOGY | Facility: CLINIC | Age: 70
End: 2025-06-20
Payer: MEDICARE

## 2025-06-20 VITALS
BODY MASS INDEX: 22.19 KG/M2 | DIASTOLIC BLOOD PRESSURE: 84 MMHG | SYSTOLIC BLOOD PRESSURE: 134 MMHG | WEIGHT: 155 LBS | OXYGEN SATURATION: 94 % | HEIGHT: 70 IN | HEART RATE: 85 BPM

## 2025-06-20 DIAGNOSIS — M62.838 MUSCLE SPASMS OF NECK: ICD-10-CM

## 2025-06-20 DIAGNOSIS — M54.81 CERVICO-OCCIPITAL NEURALGIA: Primary | ICD-10-CM

## 2025-06-20 RX ORDER — MELOXICAM 15 MG/1
1 TABLET ORAL DAILY
COMMUNITY
Start: 2025-03-24

## 2025-06-20 RX ORDER — METHYLPREDNISOLONE 4 MG/1
TABLET ORAL
Qty: 1 EACH | Refills: 0 | Status: SHIPPED | OUTPATIENT
Start: 2025-06-20

## 2025-06-20 RX ORDER — GABAPENTIN 400 MG/1
400 CAPSULE ORAL NIGHTLY
Qty: 90 CAPSULE | Refills: 1 | Status: SHIPPED | OUTPATIENT
Start: 2025-06-20 | End: 2026-06-20

## 2025-06-20 NOTE — PROGRESS NOTES
Procedure note for occipital nerve block-The patient's chart was reviewed.  After obtaining verbal consent the patient was placed in a sitting position with her neck flexed and her chin touching her chest.  Both the left and right occipital areas were prepped with antiseptic solution and injected with bupivacaine using a 27-gauge needle.  3 cc of solution was injected at the site of the greater occipital nerve on each side and 2 cc was injected in the lesser occipital nerve on each side.  I did give her trigger point shots in the right trapezius with 1 cc of bupivacaine.      Of note-patient states  She states that in the past 2 weeks she has had excruciating left occipital pain radiating to her left ear especially when she lays on that side at night.  I have told her to increase her gabapentin to 400 mg nightly along with her amitriptyline  25 mg nightly and Flexeril 5 to 10 mg only as needed.  She will be flying to [place] for the next 3 weeks to be with her daughter who is having surgery therefore I have also told her to take a Medrol Dosepak for the next few days to help with the pain in her head/her right shoulder as she had her last Kenalog shot about 3 months ago and the pain has started to come back.  For her dystonic tremor she has stopped getting Botox but her tremors have been well-controlled and that she is stressed out.  She also had her pacemaker replaced 3 weeks ago which went off well.  Has also been started on Zoloft 100 mg daily by her psychiatrist due to lack of motivation.

## 2025-07-29 DIAGNOSIS — M12.811 ROTATOR CUFF ARTHROPATHY OF RIGHT SHOULDER: Primary | ICD-10-CM

## 2025-08-07 ENCOUNTER — OFFICE VISIT (OUTPATIENT)
Dept: ORTHOPEDIC SURGERY | Facility: CLINIC | Age: 70
End: 2025-08-07
Payer: MEDICARE

## 2025-08-07 VITALS
DIASTOLIC BLOOD PRESSURE: 74 MMHG | HEIGHT: 70 IN | WEIGHT: 155.8 LBS | BODY MASS INDEX: 22.3 KG/M2 | SYSTOLIC BLOOD PRESSURE: 130 MMHG

## 2025-08-07 DIAGNOSIS — M19.011 ARTHRITIS OF RIGHT ACROMIOCLAVICULAR JOINT: ICD-10-CM

## 2025-08-07 DIAGNOSIS — M75.101 ROTATOR CUFF SYNDROME OF RIGHT SHOULDER: ICD-10-CM

## 2025-08-07 DIAGNOSIS — M25.511 RIGHT SHOULDER PAIN, UNSPECIFIED CHRONICITY: Primary | ICD-10-CM

## 2025-08-07 DIAGNOSIS — M54.2 CERVICALGIA: ICD-10-CM

## 2025-08-07 PROCEDURE — 99203 OFFICE O/P NEW LOW 30 MIN: CPT | Performed by: ORTHOPAEDIC SURGERY

## 2025-08-07 RX ORDER — LORAZEPAM 1 MG/1
TABLET ORAL
COMMUNITY
Start: 2025-08-06

## (undated) DEVICE — LEX CATH LAB MINOR: Brand: MEDLINE INDUSTRIES, INC.

## (undated) DEVICE — VIOLET BRAIDED (POLYGLACTIN 910), SYNTHETIC ABSORBABLE SUTURE: Brand: COATED VICRYL

## (undated) DEVICE — DRSNG SURG AQUACEL AG/ADVNTGE 9X15CM 3.5X6IN

## (undated) DEVICE — ADHS SKIN MASTISOL CAP 2OZ DISP

## (undated) DEVICE — PCH INST SURG INVISISHIELD 2PCKT

## (undated) DEVICE — DRAPE,INSTRUMENT,MAGNETIC,10X16: Brand: MEDLINE

## (undated) DEVICE — ANTIBACTERIAL UNDYED BRAIDED (POLYGLACTIN 910), SYNTHETIC ABSORBABLE SUTURE: Brand: COATED VICRYL